# Patient Record
Sex: FEMALE | Race: BLACK OR AFRICAN AMERICAN | NOT HISPANIC OR LATINO
[De-identification: names, ages, dates, MRNs, and addresses within clinical notes are randomized per-mention and may not be internally consistent; named-entity substitution may affect disease eponyms.]

---

## 2017-02-11 PROBLEM — Z00.00 ENCOUNTER FOR PREVENTIVE HEALTH EXAMINATION: Status: ACTIVE | Noted: 2017-02-11

## 2017-02-23 ENCOUNTER — RECORD ABSTRACTING (OUTPATIENT)
Age: 67
End: 2017-02-23

## 2017-02-23 DIAGNOSIS — Z92.241 PERSONAL HISTORY OF SYSTEMIC STEROID THERAPY: ICD-10-CM

## 2017-02-23 DIAGNOSIS — M25.562 PAIN IN RIGHT KNEE: ICD-10-CM

## 2017-02-23 DIAGNOSIS — M25.561 PAIN IN RIGHT KNEE: ICD-10-CM

## 2017-02-23 DIAGNOSIS — Z78.9 OTHER SPECIFIED HEALTH STATUS: ICD-10-CM

## 2017-02-23 DIAGNOSIS — M65.9 SYNOVITIS AND TENOSYNOVITIS, UNSPECIFIED: ICD-10-CM

## 2017-02-23 DIAGNOSIS — M89.9 DISORDER OF BONE, UNSPECIFIED: ICD-10-CM

## 2017-02-23 RX ORDER — AMLODIPINE BESYLATE 5 MG/1
TABLET ORAL
Refills: 0 | Status: ACTIVE | COMMUNITY

## 2017-02-23 RX ORDER — ACETAMINOPHEN 325 MG/1
TABLET, FILM COATED ORAL
Refills: 0 | Status: ACTIVE | COMMUNITY

## 2017-02-23 RX ORDER — HYDROCHLOROTHIAZIDE 12.5 MG/1
TABLET ORAL
Refills: 0 | Status: ACTIVE | COMMUNITY

## 2017-03-10 ENCOUNTER — APPOINTMENT (OUTPATIENT)
Dept: ORTHOPEDIC SURGERY | Facility: CLINIC | Age: 67
End: 2017-03-10

## 2017-03-10 VITALS
HEART RATE: 106 BPM | SYSTOLIC BLOOD PRESSURE: 174 MMHG | DIASTOLIC BLOOD PRESSURE: 105 MMHG | WEIGHT: 129 LBS | HEIGHT: 68 IN | BODY MASS INDEX: 19.55 KG/M2

## 2017-03-10 DIAGNOSIS — M25.461 EFFUSION, RIGHT KNEE: ICD-10-CM

## 2017-03-10 DIAGNOSIS — M25.561 PAIN IN RIGHT KNEE: ICD-10-CM

## 2017-03-24 ENCOUNTER — APPOINTMENT (OUTPATIENT)
Dept: ORTHOPEDIC SURGERY | Facility: CLINIC | Age: 67
End: 2017-03-24

## 2017-05-10 ENCOUNTER — RX RENEWAL (OUTPATIENT)
Age: 67
End: 2017-05-10

## 2017-05-10 RX ORDER — CELECOXIB 200 MG/1
200 CAPSULE ORAL
Qty: 2 | Refills: 0 | Status: ACTIVE | COMMUNITY
Start: 2017-05-10 | End: 1900-01-01

## 2017-05-15 ENCOUNTER — OUTPATIENT (OUTPATIENT)
Dept: OUTPATIENT SERVICES | Facility: HOSPITAL | Age: 67
LOS: 1 days | End: 2017-05-15
Payer: MEDICARE

## 2017-05-15 ENCOUNTER — APPOINTMENT (OUTPATIENT)
Dept: SURGERY | Facility: CLINIC | Age: 67
End: 2017-05-15

## 2017-05-15 VITALS
HEIGHT: 66 IN | SYSTOLIC BLOOD PRESSURE: 150 MMHG | WEIGHT: 136.69 LBS | RESPIRATION RATE: 16 BRPM | TEMPERATURE: 99 F | DIASTOLIC BLOOD PRESSURE: 90 MMHG | HEART RATE: 90 BPM

## 2017-05-15 DIAGNOSIS — M70.22 OLECRANON BURSITIS, LEFT ELBOW: ICD-10-CM

## 2017-05-15 DIAGNOSIS — M17.11 UNILATERAL PRIMARY OSTEOARTHRITIS, RIGHT KNEE: ICD-10-CM

## 2017-05-15 DIAGNOSIS — Z96.652 PRESENCE OF LEFT ARTIFICIAL KNEE JOINT: Chronic | ICD-10-CM

## 2017-05-15 DIAGNOSIS — I10 ESSENTIAL (PRIMARY) HYPERTENSION: ICD-10-CM

## 2017-05-15 DIAGNOSIS — J30.9 ALLERGIC RHINITIS, UNSPECIFIED: ICD-10-CM

## 2017-05-15 DIAGNOSIS — F41.1 GENERALIZED ANXIETY DISORDER: ICD-10-CM

## 2017-05-15 DIAGNOSIS — F41.9 ANXIETY DISORDER, UNSPECIFIED: ICD-10-CM

## 2017-05-15 DIAGNOSIS — Z01.818 ENCOUNTER FOR OTHER PREPROCEDURAL EXAMINATION: ICD-10-CM

## 2017-05-15 DIAGNOSIS — M85.80 OTHER SPECIFIED DISORDERS OF BONE DENSITY AND STRUCTURE, UNSPECIFIED SITE: ICD-10-CM

## 2017-05-15 LAB
ALBUMIN SERPL ELPH-MCNC: 3.1 G/DL — LOW (ref 3.4–5)
ALP SERPL-CCNC: 92 U/L — SIGNIFICANT CHANGE UP (ref 40–120)
ALT FLD-CCNC: 15 U/L — SIGNIFICANT CHANGE UP (ref 12–42)
ANION GAP SERPL CALC-SCNC: 7 MMOL/L — LOW (ref 9–16)
APPEARANCE UR: CLEAR — SIGNIFICANT CHANGE UP
APTT BLD: 33.2 SEC — SIGNIFICANT CHANGE UP (ref 27.5–37.4)
AST SERPL-CCNC: 10 U/L — LOW (ref 15–37)
BACTERIA # UR AUTO: PRESENT /HPF
BILIRUB SERPL-MCNC: 0.8 MG/DL — SIGNIFICANT CHANGE UP (ref 0.2–1.2)
BILIRUB UR-MCNC: NEGATIVE — SIGNIFICANT CHANGE UP
BUN SERPL-MCNC: 7 MG/DL — SIGNIFICANT CHANGE UP (ref 7–23)
CALCIUM SERPL-MCNC: 9.8 MG/DL — SIGNIFICANT CHANGE UP (ref 8.5–10.5)
CHLORIDE SERPL-SCNC: 106 MMOL/L — SIGNIFICANT CHANGE UP (ref 96–108)
CO2 SERPL-SCNC: 27 MMOL/L — SIGNIFICANT CHANGE UP (ref 22–31)
COLOR SPEC: YELLOW — SIGNIFICANT CHANGE UP
COMMENT - URINE: SIGNIFICANT CHANGE UP
CREAT SERPL-MCNC: 0.61 MG/DL — SIGNIFICANT CHANGE UP (ref 0.5–1.3)
DIFF PNL FLD: (no result)
EPI CELLS # UR: SIGNIFICANT CHANGE UP /HPF
GLUCOSE SERPL-MCNC: 79 MG/DL — SIGNIFICANT CHANGE UP (ref 70–99)
GLUCOSE UR QL: NEGATIVE — SIGNIFICANT CHANGE UP
HCT VFR BLD CALC: 37 % — SIGNIFICANT CHANGE UP (ref 34.5–45)
HGB BLD-MCNC: 12.4 G/DL — SIGNIFICANT CHANGE UP (ref 11.5–15.5)
INR BLD: 1.14 — SIGNIFICANT CHANGE UP (ref 0.88–1.16)
KETONES UR-MCNC: NEGATIVE — SIGNIFICANT CHANGE UP
LEUKOCYTE ESTERASE UR-ACNC: (no result)
MCHC RBC-ENTMCNC: 28.4 PG — SIGNIFICANT CHANGE UP (ref 27–34)
MCHC RBC-ENTMCNC: 33.5 G/DL — SIGNIFICANT CHANGE UP (ref 32–36)
MCV RBC AUTO: 84.9 FL — SIGNIFICANT CHANGE UP (ref 80–100)
NITRITE UR-MCNC: NEGATIVE — SIGNIFICANT CHANGE UP
PH UR: 6 — SIGNIFICANT CHANGE UP (ref 5–8)
PLATELET # BLD AUTO: 298 K/UL — SIGNIFICANT CHANGE UP (ref 150–400)
POTASSIUM SERPL-MCNC: 3.8 MMOL/L — SIGNIFICANT CHANGE UP (ref 3.5–5.3)
POTASSIUM SERPL-SCNC: 3.8 MMOL/L — SIGNIFICANT CHANGE UP (ref 3.5–5.3)
PROT SERPL-MCNC: 8.6 G/DL — HIGH (ref 6.4–8.2)
PROT UR-MCNC: NEGATIVE MG/DL — SIGNIFICANT CHANGE UP
PROTHROM AB SERPL-ACNC: 12.7 SEC — SIGNIFICANT CHANGE UP (ref 9.8–12.7)
RBC # BLD: 4.36 M/UL — SIGNIFICANT CHANGE UP (ref 3.8–5.2)
RBC # FLD: 14.9 % — SIGNIFICANT CHANGE UP (ref 10.3–16.9)
RBC CASTS # UR COMP ASSIST: < 5 /HPF — SIGNIFICANT CHANGE UP
SODIUM SERPL-SCNC: 140 MMOL/L — SIGNIFICANT CHANGE UP (ref 135–145)
SP GR SPEC: 1.01 — SIGNIFICANT CHANGE UP (ref 1–1.03)
UROBILINOGEN FLD QL: 0.2 E.U./DL — SIGNIFICANT CHANGE UP
WBC # BLD: 5 K/UL — SIGNIFICANT CHANGE UP (ref 3.8–10.5)
WBC # FLD AUTO: 5 K/UL — SIGNIFICANT CHANGE UP (ref 3.8–10.5)
WBC UR QL: (no result) /HPF

## 2017-05-15 PROCEDURE — 71020: CPT | Mod: 26

## 2017-05-15 PROCEDURE — 93010 ELECTROCARDIOGRAM REPORT: CPT

## 2017-05-15 NOTE — H&P PST ADULT - NSANTHOSAYNRD_GEN_A_CORE
No. HARVEY screening performed.  STOP BANG Legend: 0-2 = LOW Risk; 3-4 = INTERMEDIATE Risk; 5-8 = HIGH Risk

## 2017-05-15 NOTE — H&P PST ADULT - HISTORY OF PRESENT ILLNESS
66 year old female with osteoarthritis right knee with moderate knee pain, deformity, decreased ROM and unsteady gait progressively increased over years.  X rays confirmed diagnosis.  Symptoms worse with stairs and after prolonged imobility. 66 year old black female with osteoarthritis right knee with moderate knee pain, deformity, decreased ROM and unsteady gait progressively increased over years, worse since August 2016 after left TKR done.  X rays confirmed diagnosis.  Symptoms worse with stairs and after prolonged imobility.

## 2017-05-15 NOTE — H&P PST ADULT - REASON FOR ADMISSION
8/23/2023              Susan Joseph        [id number] [place] [address],     This is the office of Dr. Maida Fleischer. Thank you for putting your trust in [place] [address]. Our goal is to deliver the highest quality healthcare and an exceptional patient experience. Upon reviewing of your medical record shows you are due for the following:       Annual Medicare Physical       Please call [phone] to schedule your appointment or schedule online via [employer]. If you changed to a new provider at another facility, please notify the clinic to update your records. If you had any recent testing at another facility, please have your results faxed to our office at [phone]. Thank you and have a great day! pre op clearance for right TKR

## 2017-05-15 NOTE — H&P PST ADULT - FAMILY HISTORY
Father  Still living? No  Family history of hypertension in father, Age at diagnosis: Age Unknown     Mother  Still living? No  Family history of hypertension in mother, Age at diagnosis: Age Unknown     Sibling  Still living? No  Family history of hypertension in sister, Age at diagnosis: Age Unknown

## 2017-05-16 LAB
CULTURE RESULTS: NO GROWTH — SIGNIFICANT CHANGE UP
SPECIMEN SOURCE: SIGNIFICANT CHANGE UP

## 2017-05-17 LAB
MRSA PCR RESULT.: NEGATIVE — SIGNIFICANT CHANGE UP
S AUREUS DNA NOSE QL NAA+PROBE: NEGATIVE — SIGNIFICANT CHANGE UP

## 2017-06-02 RX ORDER — DOCUSATE SODIUM 100 MG
100 CAPSULE ORAL THREE TIMES A DAY
Qty: 0 | Refills: 0 | Status: DISCONTINUED | OUTPATIENT
Start: 2017-06-06 | End: 2017-06-09

## 2017-06-02 RX ORDER — AMLODIPINE BESYLATE 2.5 MG/1
10 TABLET ORAL DAILY
Qty: 0 | Refills: 0 | Status: DISCONTINUED | OUTPATIENT
Start: 2017-06-06 | End: 2017-06-07

## 2017-06-02 RX ORDER — FERROUS SULFATE 325(65) MG
325 TABLET ORAL
Qty: 0 | Refills: 0 | Status: DISCONTINUED | OUTPATIENT
Start: 2017-06-06 | End: 2017-06-09

## 2017-06-02 RX ORDER — SODIUM CHLORIDE 9 MG/ML
1000 INJECTION, SOLUTION INTRAVENOUS
Qty: 0 | Refills: 0 | Status: DISCONTINUED | OUTPATIENT
Start: 2017-06-06 | End: 2017-06-08

## 2017-06-02 RX ORDER — ENOXAPARIN SODIUM 100 MG/ML
40 INJECTION SUBCUTANEOUS DAILY
Qty: 0 | Refills: 0 | Status: DISCONTINUED | OUTPATIENT
Start: 2017-06-07 | End: 2017-06-09

## 2017-06-02 RX ORDER — ACETAMINOPHEN 500 MG
650 TABLET ORAL EVERY 6 HOURS
Qty: 0 | Refills: 0 | Status: DISCONTINUED | OUTPATIENT
Start: 2017-06-06 | End: 2017-06-09

## 2017-06-02 RX ORDER — MAGNESIUM HYDROXIDE 400 MG/1
30 TABLET, CHEWABLE ORAL DAILY
Qty: 0 | Refills: 0 | Status: DISCONTINUED | OUTPATIENT
Start: 2017-06-06 | End: 2017-06-09

## 2017-06-02 RX ORDER — SENNA PLUS 8.6 MG/1
2 TABLET ORAL AT BEDTIME
Qty: 0 | Refills: 0 | Status: DISCONTINUED | OUTPATIENT
Start: 2017-06-06 | End: 2017-06-09

## 2017-06-06 ENCOUNTER — APPOINTMENT (OUTPATIENT)
Dept: ORTHOPEDIC SURGERY | Facility: HOSPITAL | Age: 67
End: 2017-06-06

## 2017-06-06 ENCOUNTER — RESULT REVIEW (OUTPATIENT)
Age: 67
End: 2017-06-06

## 2017-06-06 ENCOUNTER — INPATIENT (INPATIENT)
Facility: HOSPITAL | Age: 67
LOS: 2 days | Discharge: NURSING, REHAB OR LONG-TERM CARE | DRG: 470 | End: 2017-06-09
Attending: ORTHOPAEDIC SURGERY | Admitting: ORTHOPAEDIC SURGERY
Payer: MEDICARE

## 2017-06-06 VITALS
HEIGHT: 65 IN | HEART RATE: 113 BPM | DIASTOLIC BLOOD PRESSURE: 92 MMHG | TEMPERATURE: 98 F | OXYGEN SATURATION: 100 % | WEIGHT: 127.21 LBS | SYSTOLIC BLOOD PRESSURE: 159 MMHG | RESPIRATION RATE: 16 BRPM

## 2017-06-06 DIAGNOSIS — M19.90 UNSPECIFIED OSTEOARTHRITIS, UNSPECIFIED SITE: ICD-10-CM

## 2017-06-06 DIAGNOSIS — M85.80 OTHER SPECIFIED DISORDERS OF BONE DENSITY AND STRUCTURE, UNSPECIFIED SITE: ICD-10-CM

## 2017-06-06 DIAGNOSIS — Z96.652 PRESENCE OF LEFT ARTIFICIAL KNEE JOINT: Chronic | ICD-10-CM

## 2017-06-06 DIAGNOSIS — M17.11 UNILATERAL PRIMARY OSTEOARTHRITIS, RIGHT KNEE: ICD-10-CM

## 2017-06-06 DIAGNOSIS — F41.1 GENERALIZED ANXIETY DISORDER: ICD-10-CM

## 2017-06-06 DIAGNOSIS — I10 ESSENTIAL (PRIMARY) HYPERTENSION: ICD-10-CM

## 2017-06-06 LAB
ANION GAP SERPL CALC-SCNC: 12 MMOL/L — SIGNIFICANT CHANGE UP (ref 5–17)
BUN SERPL-MCNC: 7 MG/DL — SIGNIFICANT CHANGE UP (ref 7–23)
CALCIUM SERPL-MCNC: 8.8 MG/DL — SIGNIFICANT CHANGE UP (ref 8.4–10.5)
CHLORIDE SERPL-SCNC: 104 MMOL/L — SIGNIFICANT CHANGE UP (ref 96–108)
CO2 SERPL-SCNC: 23 MMOL/L — SIGNIFICANT CHANGE UP (ref 22–31)
CREAT SERPL-MCNC: 0.6 MG/DL — SIGNIFICANT CHANGE UP (ref 0.5–1.3)
GLUCOSE SERPL-MCNC: 132 MG/DL — HIGH (ref 70–99)
HCT VFR BLD CALC: 30.3 % — LOW (ref 34.5–45)
HGB BLD-MCNC: 10.3 G/DL — LOW (ref 11.5–15.5)
MCHC RBC-ENTMCNC: 29 PG — SIGNIFICANT CHANGE UP (ref 27–34)
MCHC RBC-ENTMCNC: 34 G/DL — SIGNIFICANT CHANGE UP (ref 32–36)
MCV RBC AUTO: 85.4 FL — SIGNIFICANT CHANGE UP (ref 80–100)
PLATELET # BLD AUTO: 238 K/UL — SIGNIFICANT CHANGE UP (ref 150–400)
POTASSIUM SERPL-MCNC: 3.5 MMOL/L — SIGNIFICANT CHANGE UP (ref 3.5–5.3)
POTASSIUM SERPL-SCNC: 3.5 MMOL/L — SIGNIFICANT CHANGE UP (ref 3.5–5.3)
RBC # BLD: 3.55 M/UL — LOW (ref 3.8–5.2)
RBC # FLD: 14.8 % — SIGNIFICANT CHANGE UP (ref 10.3–16.9)
SODIUM SERPL-SCNC: 139 MMOL/L — SIGNIFICANT CHANGE UP (ref 135–145)
WBC # BLD: 4.5 K/UL — SIGNIFICANT CHANGE UP (ref 3.8–10.5)
WBC # FLD AUTO: 4.5 K/UL — SIGNIFICANT CHANGE UP (ref 3.8–10.5)

## 2017-06-06 PROCEDURE — 27447 TOTAL KNEE ARTHROPLASTY: CPT | Mod: RT

## 2017-06-06 PROCEDURE — 73560 X-RAY EXAM OF KNEE 1 OR 2: CPT | Mod: 26,RT

## 2017-06-06 RX ORDER — VANCOMYCIN HCL 1 G
1000 VIAL (EA) INTRAVENOUS EVERY 12 HOURS
Qty: 0 | Refills: 0 | Status: COMPLETED | OUTPATIENT
Start: 2017-06-06 | End: 2017-06-06

## 2017-06-06 RX ORDER — BUPIVACAINE 13.3 MG/ML
20 INJECTION, SUSPENSION, LIPOSOMAL INFILTRATION ONCE
Qty: 0 | Refills: 0 | Status: DISCONTINUED | OUTPATIENT
Start: 2017-06-06 | End: 2017-06-07

## 2017-06-06 RX ORDER — HYDROMORPHONE HYDROCHLORIDE 2 MG/ML
0.5 INJECTION INTRAMUSCULAR; INTRAVENOUS; SUBCUTANEOUS
Qty: 0 | Refills: 0 | Status: DISCONTINUED | OUTPATIENT
Start: 2017-06-06 | End: 2017-06-09

## 2017-06-06 RX ORDER — ACETAMINOPHEN 500 MG
1000 TABLET ORAL ONCE
Qty: 0 | Refills: 0 | Status: COMPLETED | OUTPATIENT
Start: 2017-06-06 | End: 2017-06-06

## 2017-06-06 RX ORDER — HYDROMORPHONE HYDROCHLORIDE 2 MG/ML
0.5 INJECTION INTRAMUSCULAR; INTRAVENOUS; SUBCUTANEOUS
Qty: 0 | Refills: 0 | Status: DISCONTINUED | OUTPATIENT
Start: 2017-06-06 | End: 2017-06-06

## 2017-06-06 RX ORDER — FUROSEMIDE 40 MG
1 TABLET ORAL
Qty: 0 | Refills: 0 | COMMUNITY

## 2017-06-06 RX ORDER — OXYCODONE HYDROCHLORIDE 5 MG/1
10 TABLET ORAL EVERY 4 HOURS
Qty: 0 | Refills: 0 | Status: DISCONTINUED | OUTPATIENT
Start: 2017-06-06 | End: 2017-06-09

## 2017-06-06 RX ORDER — OXYCODONE HYDROCHLORIDE 5 MG/1
5 TABLET ORAL EVERY 4 HOURS
Qty: 0 | Refills: 0 | Status: DISCONTINUED | OUTPATIENT
Start: 2017-06-06 | End: 2017-06-09

## 2017-06-06 RX ADMIN — HYDROMORPHONE HYDROCHLORIDE 0.5 MILLIGRAM(S): 2 INJECTION INTRAMUSCULAR; INTRAVENOUS; SUBCUTANEOUS at 14:00

## 2017-06-06 RX ADMIN — HYDROMORPHONE HYDROCHLORIDE 0.5 MILLIGRAM(S): 2 INJECTION INTRAMUSCULAR; INTRAVENOUS; SUBCUTANEOUS at 13:50

## 2017-06-06 RX ADMIN — HYDROMORPHONE HYDROCHLORIDE 0.5 MILLIGRAM(S): 2 INJECTION INTRAMUSCULAR; INTRAVENOUS; SUBCUTANEOUS at 14:25

## 2017-06-06 RX ADMIN — Medication 400 MILLIGRAM(S): at 20:55

## 2017-06-06 RX ADMIN — SODIUM CHLORIDE 75 MILLILITER(S): 9 INJECTION, SOLUTION INTRAVENOUS at 16:54

## 2017-06-06 RX ADMIN — Medication 325 MILLIGRAM(S): at 17:47

## 2017-06-06 RX ADMIN — Medication 100 MILLIGRAM(S): at 21:05

## 2017-06-06 RX ADMIN — Medication 25 MILLIGRAM(S): at 21:05

## 2017-06-06 RX ADMIN — Medication 1000 MILLIGRAM(S): at 21:30

## 2017-06-06 RX ADMIN — HYDROMORPHONE HYDROCHLORIDE 0.5 MILLIGRAM(S): 2 INJECTION INTRAMUSCULAR; INTRAVENOUS; SUBCUTANEOUS at 14:40

## 2017-06-06 RX ADMIN — Medication 250 MILLIGRAM(S): at 21:45

## 2017-06-06 RX ADMIN — HYDROMORPHONE HYDROCHLORIDE 0.5 MILLIGRAM(S): 2 INJECTION INTRAMUSCULAR; INTRAVENOUS; SUBCUTANEOUS at 14:15

## 2017-06-06 RX ADMIN — HYDROMORPHONE HYDROCHLORIDE 0.5 MILLIGRAM(S): 2 INJECTION INTRAMUSCULAR; INTRAVENOUS; SUBCUTANEOUS at 15:47

## 2017-06-06 NOTE — PHYSICAL THERAPY INITIAL EVALUATION ADULT - ADDITIONAL COMMENTS
Pt was using a cane and a RW prior to surgery. Per daughter pt lives in a ranch style home with 5 steps to enter the home.

## 2017-06-06 NOTE — H&P ADULT - HISTORY OF PRESENT ILLNESS
66 y.o. F. with h/o HTN and severe Right knee DJD with pain, swelling and decreased ROM presents today at [facility] for her Right TKR with Dr. Billings

## 2017-06-06 NOTE — PHYSICAL THERAPY INITIAL EVALUATION ADULT - PERTINENT HX OF CURRENT PROBLEM, REHAB EVAL
66 y.o. F. with h/o HTN and severe Right knee DJD with pain, swelling and decreased ROM presents today at [facility] for her Right TKR with Dr. Billings

## 2017-06-06 NOTE — PHYSICAL THERAPY INITIAL EVALUATION ADULT - ACTIVE RANGE OF MOTION EXAMINATION, REHAB EVAL
Right UE Active ROM was WFL (within functional limits)/Left LE Active ROM was WFL (within functional limits)/RLE active flexion 0-70 degrees/Left UE Active ROM was WFL (within functional limits)

## 2017-06-06 NOTE — PHYSICAL THERAPY INITIAL EVALUATION ADULT - GAIT DEVIATIONS NOTED, PT EVAL
increased time in double stance/decreased stride length/decreased step length/decreased weight-shifting ability/decreased velocity of limb motion

## 2017-06-06 NOTE — CONSULT NOTE ADULT - SUBJECTIVE AND OBJECTIVE BOX
Pain Management Consult Note - [facility] [phone]    Chief Complaint:  Right knee pain    HPI:  67 y/o female with right knee pain going for a right total knee replacement.  States she had a left total knee replacement back in Aug 2016 and had oxycodone at that time.  States it caused constipation.      Pain is ___ sharp ____dull ___burning __x_achy ___ Intensity: ____ mild ___mod ___severe     Location _x___surgical site ____cervical _____lumbar ____abd ____upper ext__x__lower ext    Worse with __x__activity ___x_movement _____physical therapy___ Rest    Improved with __x__medication _x___rest ____physical therapy    ROS: Const:  _-__febrile   Eyes:___ENT:_-__CV: _-__chest pain  Resp: _-___sob  GI:__-_nausea _-__vomiting _-__abd pain _+__npo ___clears __full diet __bm  :___ Musk: _+__pain ___spasm  Skin:___ Neuro:  _-__rsaptywc_-__ovdqlixmn_-__ numbness _+ (left leg)__weakness ___paresth  Psych:__anxiety  Endo:_-__ Heme:_-__Allergy:__PCN_______, ___all others reviewed and negative    PAST MEDICAL & SURGICAL HISTORY:  Osteopenia  Arthritis  HTN (hypertension)  H/O total knee replacement, left: (8/2016)  No significant past surgical history      SH: denies___Tobacco   _denies__Alcohol                          FH:FAMILY HISTORY:  Family history of hypertension in sister (Sibling)  Family history of hypertension in mother (Mother)  Family history of hypertension in father (Father)      BUpivacaine liposome 1.3% Injectable (no eMAR) 20milliLiter(s) Local Injection once      T(C): 36.8, Max: 36.8 (06-06 @ 08:22)  HR: 113 (113 - 113)  BP: 159/92 (159/92 - 159/92)  RR: 16 (16 - 16)  SpO2: 100% (100% - 100%)  Wt(kg): --    T(C): 36.8, Max: 36.8 (06-06 @ 08:22)  HR: 113 (113 - 113)  BP: 159/92 (159/92 - 159/92)  RR: 16 (16 - 16)  SpO2: 100% (100% - 100%)  Wt(kg): --    T(C): 36.8, Max: 36.8 (06-06 @ 08:22)  HR: 113 (113 - 113)  BP: 159/92 (159/92 - 159/92)  RR: 16 (16 - 16)  SpO2: 100% (100% - 100%)  Wt(kg): --    PHYSICAL EXAM:  Gen Appearance: _x__no acute distress _x__appropriate        Neuro: _x__SILT feet___x_ EOM Intact Psych: AAOX_3_, _x__mood/affect appropriate        Eyes: __x_conjunctiva WNL  ___x__ Pupils equal and round        ENT: _x__ears and nose atraumatic__x_ Hearing grossly intact        Neck: ___trachea midline, no visible masses ___thyroid without palpable mass    Resp: _x__Nml WOB____No tactile fremitus ___clear to auscultation    Cardio: ___extremities free from edema ____pedal pulses palpable    GI/Abdomen: _x__soft __x___ Nontender______Nondistended_____HSM    Lymphatic: ___no palpable nodes in neck  ___no palpable nodes calves and feet    Skin/Wound: _x__Incision (healed left knee incision), ___Dressing c/d/i,   ____surrounding tissues soft,  ___drain/chest tube present____    Muscular: EHL __5_/5  Gastrocnemius___/5    _x__absent clubbing/cyanosis      ASSESSMENT: This is a 66y old Female with a history of   M17.11: M17.11  No h/o HF  Family history of hypertension in sister (Sibling)  Family history of hypertension in mother (Mother)  Family history of hypertension in father (Father)  Osteopenia  Arthritis  HTN (hypertension)  Anticipatory anxiety: Anticipatory anxiety  Osteopenia: Osteopenia  Primary osteoarthritis of right knee: Primary osteoarthritis of right knee  Essential hypertension: Essential hypertension  Arthritis: Arthritis  Status post total knee replacement  H/O total knee replacement, left: (8/2016)  and worsening right knee pain going for right total knee replacement today.      Recommended Treatment PLAN:  1.  Oxycodone 5-10 mg po q4h prn  2.  Dilaudid 0.5 mg IV q2h prn  3.  Tylenol 1000 mg IV x1  4.  Lyrica 25 mg po TID

## 2017-06-06 NOTE — CONSULT NOTE ADULT - SUBJECTIVE AND OBJECTIVE BOX
HPI:  66 year old black female with osteoarthritis right knee with moderate knee pain, deformity, decreased ROM and unsteady gait progressively increased over years, worse since August 2016 after left TKR done.  X rays confirmed diagnosis.  Symptoms worse with stairs and after prolonged imobility.    PAST MEDICAL & SURGICAL HISTORY:  Osteopenia allergic rhinitis  Arthritis  HTN (hypertension)  H/O total knee replacement, left: (8/2016)  No significant past surgical history      REVIEW OF SYSTEMS    General:  malaise	  Skin/Breast: normal  Ophthalmologic: negative  ENMT:	normal  Respiratory and Thorax: normal  Cardiovascular:	normal  Gastrointestinal:	normal  Genitourinary:	normal  Musculoskeletal: right knee pain left elbow olecranon bursitis  Neurological:	normal  Psychiatric:	normal  Hematology/Lymphatics:	negative  Endocrine:	negative  Allergic/Immunologic:	negative      MEDICATIONS        amlodipine-benazepril 10 mg-20 mg oral capsule 1 cap(s) orally once a day  · 	Tylenol 325 mg oral capsule  orally as needed for pain         Allergies    penicillin (Urticaria)    SOCIAL HISTORY: no cigs  social ETOH    FAMILY HISTORY:  Family history of hypertension in sister (Sibling)  Family history of hypertension in mother (Mother)  Family history of hypertension in father (Father)      PHYSICAL EXAM:  Vital Signs Last 24 Hrs  T(C): --  T(F): --98.6  HR: --70  BP: --120/80  BP(mean): --  RR: --16  SpO2: --    Constitutional: WDWNF in NAD  Eyes: conj pink  ENMT: negative  Neck: supple  Breasts: not examined   Back: negative  Respiratory: clear to P&A  Cardiovascular: no MRGT or H  Gastrointestinal: normal bowel sounds  Genitourinary: neg  Rectal: not examined  Extremities: normal  Vascular: normal  Neurological: normal  Skin: negative  Lymph Nodes: negative  Musculoskeletal:   normal  Psychiatric: anxiety

## 2017-06-06 NOTE — PHYSICAL THERAPY INITIAL EVALUATION ADULT - CRITERIA FOR SKILLED THERAPEUTIC INTERVENTIONS
anticipated discharge recommendation/rehab potential/anticipated equipment needs at discharge/therapy frequency/risk reduction/prevention/impairments found/predicted duration of therapy intervention/functional limitations in following categories

## 2017-06-07 LAB
ALBUMIN SERPL ELPH-MCNC: 3.1 G/DL — LOW (ref 3.3–5)
ALP SERPL-CCNC: 75 U/L — SIGNIFICANT CHANGE UP (ref 40–120)
ALT FLD-CCNC: 10 U/L — SIGNIFICANT CHANGE UP (ref 10–45)
ANION GAP SERPL CALC-SCNC: 18 MMOL/L — HIGH (ref 5–17)
AST SERPL-CCNC: 13 U/L — SIGNIFICANT CHANGE UP (ref 10–40)
BILIRUB SERPL-MCNC: 0.4 MG/DL — SIGNIFICANT CHANGE UP (ref 0.2–1.2)
BUN SERPL-MCNC: 5 MG/DL — LOW (ref 7–23)
CALCIUM SERPL-MCNC: 9.4 MG/DL — SIGNIFICANT CHANGE UP (ref 8.4–10.5)
CHLORIDE SERPL-SCNC: 94 MMOL/L — LOW (ref 96–108)
CO2 SERPL-SCNC: 23 MMOL/L — SIGNIFICANT CHANGE UP (ref 22–31)
CREAT SERPL-MCNC: 0.4 MG/DL — LOW (ref 0.5–1.3)
GLUCOSE SERPL-MCNC: 117 MG/DL — HIGH (ref 70–99)
HCT VFR BLD CALC: 32.7 % — LOW (ref 34.5–45)
HGB BLD-MCNC: 11.2 G/DL — LOW (ref 11.5–15.5)
MCHC RBC-ENTMCNC: 28.8 PG — SIGNIFICANT CHANGE UP (ref 27–34)
MCHC RBC-ENTMCNC: 34.3 G/DL — SIGNIFICANT CHANGE UP (ref 32–36)
MCV RBC AUTO: 84.1 FL — SIGNIFICANT CHANGE UP (ref 80–100)
PLATELET # BLD AUTO: 269 K/UL — SIGNIFICANT CHANGE UP (ref 150–400)
POTASSIUM SERPL-MCNC: 3.3 MMOL/L — LOW (ref 3.5–5.3)
POTASSIUM SERPL-SCNC: 3.3 MMOL/L — LOW (ref 3.5–5.3)
PROT SERPL-MCNC: 7.7 G/DL — SIGNIFICANT CHANGE UP (ref 6–8.3)
RBC # BLD: 3.89 M/UL — SIGNIFICANT CHANGE UP (ref 3.8–5.2)
RBC # FLD: 14.2 % — SIGNIFICANT CHANGE UP (ref 10.3–16.9)
SODIUM SERPL-SCNC: 135 MMOL/L — SIGNIFICANT CHANGE UP (ref 135–145)
SURGICAL PATHOLOGY STUDY: SIGNIFICANT CHANGE UP
WBC # BLD: 7 K/UL — SIGNIFICANT CHANGE UP (ref 3.8–10.5)
WBC # FLD AUTO: 7 K/UL — SIGNIFICANT CHANGE UP (ref 3.8–10.5)

## 2017-06-07 RX ORDER — CELECOXIB 200 MG/1
1 CAPSULE ORAL
Qty: 0 | Refills: 0 | COMMUNITY

## 2017-06-07 RX ORDER — ACETAMINOPHEN 500 MG
975 TABLET ORAL EVERY 8 HOURS
Qty: 0 | Refills: 0 | Status: DISCONTINUED | OUTPATIENT
Start: 2017-06-07 | End: 2017-06-09

## 2017-06-07 RX ORDER — ENOXAPARIN SODIUM 100 MG/ML
40 INJECTION SUBCUTANEOUS
Qty: 0 | Refills: 0 | COMMUNITY
Start: 2017-06-07 | End: 2017-06-21

## 2017-06-07 RX ORDER — SENNA PLUS 8.6 MG/1
2 TABLET ORAL
Qty: 0 | Refills: 0 | COMMUNITY
Start: 2017-06-07

## 2017-06-07 RX ORDER — ACETAMINOPHEN 500 MG
2 TABLET ORAL
Qty: 0 | Refills: 0 | COMMUNITY
Start: 2017-06-07

## 2017-06-07 RX ORDER — ACETAMINOPHEN 500 MG
2 TABLET ORAL
Qty: 0 | Refills: 0 | COMMUNITY

## 2017-06-07 RX ORDER — POTASSIUM CHLORIDE 20 MEQ
40 PACKET (EA) ORAL ONCE
Qty: 0 | Refills: 0 | Status: COMPLETED | OUTPATIENT
Start: 2017-06-07 | End: 2017-06-07

## 2017-06-07 RX ORDER — OXYCODONE HYDROCHLORIDE 5 MG/1
1 TABLET ORAL
Qty: 0 | Refills: 0 | COMMUNITY
Start: 2017-06-07

## 2017-06-07 RX ORDER — DOCUSATE SODIUM 100 MG
1 CAPSULE ORAL
Qty: 0 | Refills: 0 | COMMUNITY
Start: 2017-06-07

## 2017-06-07 RX ORDER — ACETAMINOPHEN 500 MG
0 TABLET ORAL
Qty: 0 | Refills: 0 | COMMUNITY

## 2017-06-07 RX ADMIN — AMLODIPINE BESYLATE 10 MILLIGRAM(S): 2.5 TABLET ORAL at 05:06

## 2017-06-07 RX ADMIN — Medication 1 TABLET(S): at 08:35

## 2017-06-07 RX ADMIN — HYDROMORPHONE HYDROCHLORIDE 0.5 MILLIGRAM(S): 2 INJECTION INTRAMUSCULAR; INTRAVENOUS; SUBCUTANEOUS at 10:17

## 2017-06-07 RX ADMIN — Medication 25 MILLIGRAM(S): at 20:13

## 2017-06-07 RX ADMIN — Medication 325 MILLIGRAM(S): at 17:39

## 2017-06-07 RX ADMIN — Medication 25 MILLIGRAM(S): at 13:16

## 2017-06-07 RX ADMIN — OXYCODONE HYDROCHLORIDE 10 MILLIGRAM(S): 5 TABLET ORAL at 20:13

## 2017-06-07 RX ADMIN — OXYCODONE HYDROCHLORIDE 10 MILLIGRAM(S): 5 TABLET ORAL at 09:11

## 2017-06-07 RX ADMIN — Medication 325 MILLIGRAM(S): at 05:06

## 2017-06-07 RX ADMIN — Medication 100 MILLIGRAM(S): at 13:16

## 2017-06-07 RX ADMIN — Medication 975 MILLIGRAM(S): at 20:13

## 2017-06-07 RX ADMIN — HYDROMORPHONE HYDROCHLORIDE 0.5 MILLIGRAM(S): 2 INJECTION INTRAMUSCULAR; INTRAVENOUS; SUBCUTANEOUS at 10:30

## 2017-06-07 RX ADMIN — Medication 325 MILLIGRAM(S): at 13:16

## 2017-06-07 RX ADMIN — Medication 100 MILLIGRAM(S): at 05:06

## 2017-06-07 RX ADMIN — Medication 100 MILLIGRAM(S): at 20:13

## 2017-06-07 RX ADMIN — OXYCODONE HYDROCHLORIDE 10 MILLIGRAM(S): 5 TABLET ORAL at 10:10

## 2017-06-07 RX ADMIN — Medication 25 MILLIGRAM(S): at 05:06

## 2017-06-07 RX ADMIN — OXYCODONE HYDROCHLORIDE 10 MILLIGRAM(S): 5 TABLET ORAL at 21:13

## 2017-06-07 RX ADMIN — OXYCODONE HYDROCHLORIDE 10 MILLIGRAM(S): 5 TABLET ORAL at 05:49

## 2017-06-07 RX ADMIN — Medication 40 MILLIEQUIVALENT(S): at 08:35

## 2017-06-07 RX ADMIN — ENOXAPARIN SODIUM 40 MILLIGRAM(S): 100 INJECTION SUBCUTANEOUS at 07:21

## 2017-06-07 RX ADMIN — OXYCODONE HYDROCHLORIDE 10 MILLIGRAM(S): 5 TABLET ORAL at 05:06

## 2017-06-07 NOTE — PROGRESS NOTE ADULT - SUBJECTIVE AND OBJECTIVE BOX
ORTHO NOTE    [x ] Pt seen/examined.  [ ] Pt without any complaints/in NAD.    [x ] Pt complains of: bleeding and incisional pain  Patient speaks Swedish Creole and refused  line, preferring daughter to translate.  Patient and daughter verbalized understanding of conversation and physical exam      ROS: [ ] Fever  [ ] Chills  [ ] CP [ ] SOB [ ] Dysnea  [ ] Palpitations [ ] Cough [ ] N/V/C/D [ ] Paresthia [ ] Other     [x ] ROS  otherwise negative    .    PHYSICAL EXAM:    Vital Signs Last 24 Hrs  T(C): 36.5, Max: 36.7 (06-06 @ 14:50)  T(F): 97.7, Max: 98.1 (06-06 @ 14:50)  HR: 84 (79 - 89)  BP: 143/87 (111/68 - 150/88)  BP(mean): --  RR: 14 (12 - 19)  SpO2: 99% (96% - 100%)    I&O's Detail  I & Os for 24h ending 07 Jun 2017 07:00  =============================================  IN:    lactated ringers.: 900 ml    IV PiggyBack: 250 ml    Total IN: 1150 ml  ---------------------------------------------  OUT:    Voided: 3125 ml    Accordian: 205 ml    Total OUT: 3330 ml  ---------------------------------------------  Total NET: -2180 ml    I & Os for current day (as of 07 Jun 2017 13:16)  =============================================  IN:    Oral Fluid: 300 ml    Total IN: 300 ml  ---------------------------------------------  OUT:    Voided: 500 ml    Total OUT: 500 ml  ---------------------------------------------  Total NET: -200 ml       CAPILLARY BLOOD GLUCOSE                  Neuro: AAOX3    Lungs: CTA, IS demonstrated    CV:    ABD: soft, nontender, bowel regimen    Ext: bleeding on right knee lateral and posterior dsg which drain site was reinforced and blood stained dressing removed, R LE NVID strength 5/5, ROM and extension exercises demonstrated      LABS                        11.2   7.0   )-----------( 269      ( 07 Jun 2017 07:30 )             32.7                                06-07    135  |  94<L>  |  5<L>  ----------------------------<  117<H>  3.3<L>   |  23  |  0.40<L>    Ca    9.4      07 Jun 2017 07:30    TPro  7.7  /  Alb  3.1<L>  /  TBili  0.4  /  DBili  x   /  AST  13  /  ALT  10  /  AlkPhos  75  06-07      [ ] Other Labs  [ ] None ordered            Please check or [place] when present:  •  Heart Failure:    [ ] Acute        [ ]  Acute on Chronic        [ ] Chronic         [ ] Diastolic     [ ]  Combined    •  BRITTANIE:     [ ] ATN        [ ]  Renal medullary necrosis       [ ]  Renal cortical necrosis                  [ ] Other pathological Lesion:  •  CKD:  [ ] Stage I   [ ] Stage II  [ ] Stage III    [ ]Stage IV   [ ]  CKD V   [ ]  Other/Unspecified:    •  Abdominal Nutritional Status:   [ ] Malnutrition-See Nutrition note    [ ] Cachexia   [ ]  Other        [ ] Supplement ordered:            [ ] Morbid Obesity: BMI >=40         ASSESSMENT/PLAN:      STATUS POST: R TKA    CONTINUE:          [ ] PT- WBAT    [ ] DVT PPX- lovenox 40mg daily    [ ] Pain Mgt- followed by pain management    [ ] Dispo plan- FELIX

## 2017-06-07 NOTE — DISCHARGE NOTE ADULT - PLAN OF CARE
Improved ambulation and decreased pain after right total knee replacement 6/6/17 Weight bearing as tolerated on right leg with assistive device (rolling walker) and assistance.  No strenuous activity, heavy lifting, driving, tub bathing, or returning to work until cleared by MD.  You may shower--dressing is waterproof.  Remove dressing after post op day 7, then leave incision open to air.  Follow up with Dr. Billings to schedule an appt within 10-14 days.  If you don't have a bowel movement by post op day 3, then take Milk of Magnesia (over the counter).  If no bowel movement by at least post op day 5, then use a Dulcolax suppository (over the counter) and/or a Fleets enema--if still no bowel movement, call your MD.  Contact your doctor if you experience: fever greater than 101.5, chills, chest pain, difficulty breathing, bleeding, redness or heat around the incision. Weight bearing as tolerated on right leg with assistive device (rolling walker) and assistance.  No strenuous activity, heavy lifting, driving, tub bathing, or returning to work until cleared by MD.  You may shower--dressing is waterproof.  Remove dressing after post op day 7, then leave incision open to air.  Follow up with Dr. Billings to schedule an appt within 10-14 days.  If you don't have a bowel movement by post op day 3, then take Milk of Magnesia (over the counter).  If no bowel movement by at least post op day 5, then use a Dulcolax suppository (over the counter) and/or a Fleets enema--if still no bowel movement, call your MD.  Contact your doctor if you experience: fever greater than 101.5, chills, chest pain, difficulty breathing, bleeding, redness or heat around the incision.  Patient was hyponatremic after surgery but Na is not 135.  You can continue to trend bmp to monitor Na.

## 2017-06-07 NOTE — PROGRESS NOTE ADULT - SUBJECTIVE AND OBJECTIVE BOX
HPI:  66 year old black female with osteoarthritis right knee with moderate knee pain, deformity, decreased ROM and unsteady gait progressively increased over years, worse since August 2016 after left TKR done.  X rays confirmed diagnosis.  Symptoms worse with stairs and after prolonged imobility. Patient day #1 with moderate knee pain and malaise.      PAST MEDICAL & SURGICAL HISTORY:  Osteopenia  Arthritis  HTN (hypertension)  H/O total knee replacement, left: (8/2016)  No significant past surgical history      MEDICATIONS  (STANDING):  lactated ringers. 1000milliLiter(s) IV Continuous <Continuous>  enoxaparin Injectable 40milliGRAM(s) SubCutaneous daily  docusate sodium 100milliGRAM(s) Oral three times a day  ferrous    sulfate 325milliGRAM(s) Oral three times a day with meals  multivitamin 1Tablet(s) Oral daily  pregabalin 25milliGRAM(s) Oral three times a day  amLODIPine 10 mG/benazepril 20 mG 1Capsule(s) Oral daily    MEDICATIONS  (PRN):  acetaminophen   Tablet 650milliGRAM(s) Oral every 6 hours PRN For Temp greater than 38 C (100.4 F)  aluminum hydroxide/magnesium hydroxide/simethicone Suspension 30milliLiter(s) Oral four times a day PRN Indigestion  magnesium hydroxide Suspension 30milliLiter(s) Oral daily PRN Constipation  senna 2Tablet(s) Oral at bedtime PRN Constipation  oxyCODONE IR 5milliGRAM(s) Oral every 4 hours PRN Moderate Pain (4 - 6)  oxyCODONE IR 10milliGRAM(s) Oral every 4 hours PRN Severe Pain (7 - 10)  HYDROmorphone  Injectable 0.5milliGRAM(s) IV Push every 2 hours PRN breakthrough pain      Allergies    penicillin (Urticaria)    REVIEW OF SYSTEMS    General:  malaise	  Skin/Breast: normal  Ophthalmologic: negative  ENMT:	normal  Respiratory and Thorax: normal  Cardiovascular:	normal  Gastrointestinal:	normal  Genitourinary:	normal  Musculoskeletal: right knee swelling   Neurological:	normal  Psychiatric:	normal  Hematology/Lymphatics:	negative  Endocrine:	negative  Allergic/Immunologic:	negative      PHYSICAL EXAM:  Daily Height in cm: 165.1 (06 Jun 2017 08:22)      Vital Signs Last 24 Hrs  T(C): 36.4, Max: 36.8 (06-06 @ 08:22)  T(F): 97.6, Max: 98.3 (06-06 @ 08:22)  HR: 86 (77 - 113)  BP: 150/88 (96/61 - 159/92)  BP(mean): --  RR: 16 (12 - 19)  SpO2: 99% (96% - 100%)    Constitutional: WDWNF in NAD  Eyes: conj pale  ENMT: negative  Neck: supple  Breasts: not examined   Back: negative  Respiratory: clear to P&A  Cardiovascular: no MRGT or H  Gastrointestinal: normal bowel sounds  Genitourinary: neg  Rectal: not examined  Extremities: normal  Vascular: normal  Neurological: normal  Skin: negative  Lymph Nodes: negative  Musculoskeletal:   decreased ROM  right knee  Psychiatric: anxiety               10.3   4.5   )-----------( 238      ( 06 Jun 2017 11:52 )             30.3       06-06    139  |  104  |  7   ----------------------------<  132<H>  3.5   |  23  |  0.60    Ca    8.8      06 Jun 2017 11:52

## 2017-06-07 NOTE — DISCHARGE NOTE ADULT - CARE PLAN
Principal Discharge DX:	Primary osteoarthritis of right knee  Goal:	Improved ambulation and decreased pain after right total knee replacement 6/6/17  Instructions for follow-up, activity and diet:	Weight bearing as tolerated on right leg with assistive device (rolling walker) and assistance.  No strenuous activity, heavy lifting, driving, tub bathing, or returning to work until cleared by MD.  You may shower--dressing is waterproof.  Remove dressing after post op day 7, then leave incision open to air.  Follow up with Dr. Billings to schedule an appt within 10-14 days.  If you don't have a bowel movement by post op day 3, then take Milk of Magnesia (over the counter).  If no bowel movement by at least post op day 5, then use a Dulcolax suppository (over the counter) and/or a Fleets enema--if still no bowel movement, call your MD.  Contact your doctor if you experience: fever greater than 101.5, chills, chest pain, difficulty breathing, bleeding, redness or heat around the incision. Principal Discharge DX:	Primary osteoarthritis of right knee  Goal:	Improved ambulation and decreased pain after right total knee replacement 6/6/17  Instructions for follow-up, activity and diet:	Weight bearing as tolerated on right leg with assistive device (rolling walker) and assistance.  No strenuous activity, heavy lifting, driving, tub bathing, or returning to work until cleared by MD.  You may shower--dressing is waterproof.  Remove dressing after post op day 7, then leave incision open to air.  Follow up with Dr. Billings to schedule an appt within 10-14 days.  If you don't have a bowel movement by post op day 3, then take Milk of Magnesia (over the counter).  If no bowel movement by at least post op day 5, then use a Dulcolax suppository (over the counter) and/or a Fleets enema--if still no bowel movement, call your MD.  Contact your doctor if you experience: fever greater than 101.5, chills, chest pain, difficulty breathing, bleeding, redness or heat around the incision.  Patient was hyponatremic after surgery but Na is not 135.  You can continue to trend bmp to monitor Na.

## 2017-06-07 NOTE — PROGRESS NOTE ADULT - SUBJECTIVE AND OBJECTIVE BOX
Pt. seen at 0904  Pain Management Progress Note - [facility] [phone]    HPI:  Pt. complains of right knee pain.  Used prn pain medication minimally since surgery yesterday.            Pertinent PMH: Pain at: ___Back ___Neck_x__Knee ___Hip ___Shoulder ___ Opioid tolerance    Pain is __x sharp ____dull ___burning ___achy ___ Intensity: ____ mild ____mod ____severe     Location ___x__surgical site _____cervical _____lumbar ____abd _____upper ext__x__lower ext    Worse with __x__activity ___x_movement _____physical therapy___ Rest    Improved with __x__medication _x___rest ____physical therapy    lactated ringers.  enoxaparin Injectable  acetaminophen   Tablet  aluminum hydroxide/magnesium hydroxide/simethicone Suspension  magnesium hydroxide Suspension  senna  docusate sodium  ferrous    sulfate  multivitamin  oxyCODONE IR  oxyCODONE IR  HYDROmorphone  Injectable  pregabalin  amLODIPine 10 mG/benazepril 20 mG  acetaminophen   Tablet      ROS: Const:  ___febrile   Eyes:___ENT:___CV: ___chest pain  Resp: ____sob  GI:__-_nausea _-__vomiting __-__abd pain ___npo ___clears _+__full diet __bm  :___ Musk: __+_pain ___spasm  Skin:___ Neuro:  _-__xdmxlkso__-_zopbdmnhh____ numbness ___weakness ___paresthesia  Psych:___anxiety  Endo:___ Heme:___Allergy:___      06-07 @ 07:37904 mL/min/1.73M2          Hemoglobin: 11.2 g/dL (06-07 @ 07:30)  Hemoglobin: 10.3 g/dL (06-06 @ 11:52)        T(C): 35.8, Max: 36.5 (06-07 @ 08:28)  HR: 87 (83 - 87)  BP: 157/93 (130/83 - 157/93)  RR: 16 (14 - 18)  SpO2: 97% (97% - 100%)  Wt(kg): --     PHYSICAL EXAM:  Gen Appearance: __x_no acute distress _x__appropriate         Neuro: __x_SILT feet_x___ EOM Intact Psych: AAOX_3_, _x__mood/affect appropriate        Eyes: _x__conjunctiva WNL  __x___ Pupils equal and round        ENT: _x__ears and nose atraumatic_x__ Hearing grossly intact        Neck: ___trachea midline, no visible masses ___thyroid without palpable mass    Resp: _x__Nml WOB____No tactile fremitus ___clear to auscultation    Cardio: ___extremities free from edema ____pedal pulses palpable    GI/Abdomen: ___soft _____ Nontender______Nondistended_____HSM    Lymphatic: ___no palpable nodes in neck  ___no palpable nodes calves and feet    Skin/Wound: ___Incision, _x__Dressing c/d/i,   ____surrounding tissues soft,  ___drain/chest tube present____    Muscular: EHL _5__/5  Gastrocnemius___/5    _x__absent clubbing/cyanosis         ASSESSMENT:  This is a 66y old Female with a history of:  Family history of hypertension in sister (Sibling)  Family history of hypertension in mother (Mother)  Family history of hypertension in father (Father)  Osteopenia  Inflammatory arthritis  Primary osteoarthritis of right knee  Anticipatory anxiety  Essential hypertension  Arthritis  Knee arthroplasty  Status post total knee replacement  H/O total knee replacement, left  and right knee pain S/P right total knee replacement and is doing well.        Recommended Treatment PLAN:  1.  Oxycodone 5-10 mg po q4h prn  2.  Dilaudid 0.5 mg IV q2h prn  3.  Tylenol 975 mg po q8h  4.  Lyrica 25 mg po TID

## 2017-06-07 NOTE — PROGRESS NOTE ADULT - SUBJECTIVE AND OBJECTIVE BOX
S/P R TKA on 6/6/17    S: No acute events overnight. Pain in knee but controlled. Denies any numbness, tingling, or calf pain.     O:  AFVSS    NAD, AAOx3  Dressing c/d/i  Drain patent  Sensation intact to light touch to SP, DP, Saph, Sural, and Tib nerve distributions.   +GS, TA, EHL, FHL  2+ DP pulse  Calf soft and nontender

## 2017-06-07 NOTE — DISCHARGE NOTE ADULT - CARE PROVIDER_API CALL
Rashid Billings (MD), Orthopaedic Surgery  [address] [place] [address]  Phone: [phone]  Fax: [phone]

## 2017-06-07 NOTE — DISCHARGE NOTE ADULT - PATIENT PORTAL LINK FT
“You can access the Follow[employer] Patient Portal, offered by [facility], by registering with the following website: [email or web address]”

## 2017-06-07 NOTE — PROGRESS NOTE ADULT - ASSESSMENT
POD#1 R TKA  - Pain control  - Drain removed  - OOB with PT, WBAT  - Continue Lovenox, foot pumps, and early mobilization for DVT prophylaxis   - D/C planning, most likely rehab

## 2017-06-07 NOTE — DISCHARGE NOTE ADULT - MEDICATION SUMMARY - MEDICATIONS TO TAKE
I will START or STAY ON the medications listed below when I get home from the hospital:    oxyCODONE 5 mg oral tablet  -- 1 tab(s) by mouth every 4 hours, As needed, Moderate Pain (4 - 6)  -- Indication: For Moderate pain    oxyCODONE 10 mg oral tablet  -- 1 tab(s) by mouth every 4 hours, As needed, Severe Pain (7 - 10)  -- Indication: For severe pain    acetaminophen 325 mg oral tablet  -- 2 tab(s) by mouth every 6 hours, As needed, For Temp greater than 38 C (100.4 F)  -- Indication: For fever     CeleBREX 200 mg oral capsule  -- 1 cap(s) by mouth 2 times a day  hold for s/s of bleeding  -- Indication: For Antiinflammatory    enoxaparin  -- 40 milligram(s) subcutaneous once a day  Administer for 14 days after surgery  Last day of administration 6/21/17  -- Indication: For dvt ppx    amlodipine-benazepril 10 mg-20 mg oral capsule  -- 1 cap(s) by mouth once a day  -- Indication: For hypertension    furosemide 20 mg oral tablet  -- 1 tab(s) by mouth once a day  -- Indication: For hypertension    docusate sodium 100 mg oral capsule  -- 1 cap(s) by mouth 3 times a day  -- Indication: For constipation    senna oral tablet  -- 2 tab(s) by mouth once a day (at bedtime), As needed, Constipation  -- Indication: For constipation I will START or STAY ON the medications listed below when I get home from the hospital:    oxyCODONE 5 mg oral tablet  -- 1 tab(s) by mouth every 4 hours, As needed, Moderate Pain (4 - 6)  -- Indication: For Moderate pain    oxyCODONE 10 mg oral tablet  -- 1 tab(s) by mouth every 4 hours, As needed, Severe Pain (7 - 10)  -- Indication: For severe pain    acetaminophen 325 mg oral tablet  -- 2 tab(s) by mouth every 6 hours, As needed, For Temp greater than 38 C (100.4 F)  -- Indication: For fever     CeleBREX 200 mg oral capsule  -- 1 cap(s) by mouth 2 times a day  hold for s/s of bleeding  -- Indication: For Antiinflammatory    acetaminophen 325 mg oral tablet  -- 2 tab(s) by mouth every 6 hours, as needed, mild pain (1-3)  -- Indication: For Mild pain    enoxaparin  -- 40 milligram(s) subcutaneous once a day  Administer for 14 days after surgery  Last day of administration 6/21/17  -- Indication: For dvt ppx    amlodipine-benazepril 10 mg-20 mg oral capsule  -- 1 cap(s) by mouth once a day  -- Indication: For hypertension    furosemide 20 mg oral tablet  -- 1 tab(s) by mouth once a day  -- Indication: For hypertension    docusate sodium 100 mg oral capsule  -- 1 cap(s) by mouth 3 times a day  -- Indication: For constipation    senna oral tablet  -- 2 tab(s) by mouth once a day (at bedtime), As needed, Constipation  -- Indication: For constipation

## 2017-06-07 NOTE — DISCHARGE NOTE ADULT - MEDICATION SUMMARY - MEDICATIONS TO CHANGE
I will SWITCH the dose or number of times a day I take the medications listed below when I get home from the hospital:    Tylenol 325 mg oral capsule  --  by mouth as needed for pain    Tylenol 500 mg oral tablet  -- 2 tab(s) by mouth every 6 hours

## 2017-06-08 DIAGNOSIS — D50.0 IRON DEFICIENCY ANEMIA SECONDARY TO BLOOD LOSS (CHRONIC): ICD-10-CM

## 2017-06-08 DIAGNOSIS — E87.6 HYPOKALEMIA: ICD-10-CM

## 2017-06-08 LAB
ALBUMIN SERPL ELPH-MCNC: 2.5 G/DL — LOW (ref 3.3–5)
ALP SERPL-CCNC: 76 U/L — SIGNIFICANT CHANGE UP (ref 40–120)
ALT FLD-CCNC: 9 U/L — LOW (ref 10–45)
ANION GAP SERPL CALC-SCNC: 11 MMOL/L — SIGNIFICANT CHANGE UP (ref 5–17)
ANION GAP SERPL CALC-SCNC: 13 MMOL/L — SIGNIFICANT CHANGE UP (ref 5–17)
ANION GAP SERPL CALC-SCNC: 14 MMOL/L — SIGNIFICANT CHANGE UP (ref 5–17)
AST SERPL-CCNC: 10 U/L — SIGNIFICANT CHANGE UP (ref 10–40)
BILIRUB SERPL-MCNC: 0.6 MG/DL — SIGNIFICANT CHANGE UP (ref 0.2–1.2)
BUN SERPL-MCNC: 7 MG/DL — SIGNIFICANT CHANGE UP (ref 7–23)
BUN SERPL-MCNC: 7 MG/DL — SIGNIFICANT CHANGE UP (ref 7–23)
BUN SERPL-MCNC: 9 MG/DL — SIGNIFICANT CHANGE UP (ref 7–23)
CALCIUM SERPL-MCNC: 8.8 MG/DL — SIGNIFICANT CHANGE UP (ref 8.4–10.5)
CALCIUM SERPL-MCNC: 9.3 MG/DL — SIGNIFICANT CHANGE UP (ref 8.4–10.5)
CALCIUM SERPL-MCNC: 9.5 MG/DL — SIGNIFICANT CHANGE UP (ref 8.4–10.5)
CHLORIDE SERPL-SCNC: 88 MMOL/L — LOW (ref 96–108)
CHLORIDE SERPL-SCNC: 90 MMOL/L — LOW (ref 96–108)
CHLORIDE SERPL-SCNC: 91 MMOL/L — LOW (ref 96–108)
CHLORIDE UR-SCNC: <20 MMOL/L — SIGNIFICANT CHANGE UP
CO2 SERPL-SCNC: 22 MMOL/L — SIGNIFICANT CHANGE UP (ref 22–31)
CO2 SERPL-SCNC: 24 MMOL/L — SIGNIFICANT CHANGE UP (ref 22–31)
CO2 SERPL-SCNC: 26 MMOL/L — SIGNIFICANT CHANGE UP (ref 22–31)
CREAT ?TM UR-MCNC: 73 MG/DL — SIGNIFICANT CHANGE UP
CREAT SERPL-MCNC: 0.5 MG/DL — SIGNIFICANT CHANGE UP (ref 0.5–1.3)
CREAT SERPL-MCNC: 0.5 MG/DL — SIGNIFICANT CHANGE UP (ref 0.5–1.3)
CREAT SERPL-MCNC: 0.6 MG/DL — SIGNIFICANT CHANGE UP (ref 0.5–1.3)
GLUCOSE SERPL-MCNC: 115 MG/DL — HIGH (ref 70–99)
GLUCOSE SERPL-MCNC: 117 MG/DL — HIGH (ref 70–99)
GLUCOSE SERPL-MCNC: 99 MG/DL — SIGNIFICANT CHANGE UP (ref 70–99)
HCT VFR BLD CALC: 31.5 % — LOW (ref 34.5–45)
HGB BLD-MCNC: 10.8 G/DL — LOW (ref 11.5–15.5)
MCHC RBC-ENTMCNC: 28.6 PG — SIGNIFICANT CHANGE UP (ref 27–34)
MCHC RBC-ENTMCNC: 34.3 G/DL — SIGNIFICANT CHANGE UP (ref 32–36)
MCV RBC AUTO: 83.6 FL — SIGNIFICANT CHANGE UP (ref 80–100)
OSMOLALITY UR: 279 MOSMOL/KG — SIGNIFICANT CHANGE UP (ref 100–650)
PLATELET # BLD AUTO: 277 K/UL — SIGNIFICANT CHANGE UP (ref 150–400)
POTASSIUM SERPL-MCNC: 3.5 MMOL/L — SIGNIFICANT CHANGE UP (ref 3.5–5.3)
POTASSIUM SERPL-MCNC: 3.5 MMOL/L — SIGNIFICANT CHANGE UP (ref 3.5–5.3)
POTASSIUM SERPL-MCNC: 3.8 MMOL/L — SIGNIFICANT CHANGE UP (ref 3.5–5.3)
POTASSIUM SERPL-SCNC: 3.5 MMOL/L — SIGNIFICANT CHANGE UP (ref 3.5–5.3)
POTASSIUM SERPL-SCNC: 3.5 MMOL/L — SIGNIFICANT CHANGE UP (ref 3.5–5.3)
POTASSIUM SERPL-SCNC: 3.8 MMOL/L — SIGNIFICANT CHANGE UP (ref 3.5–5.3)
POTASSIUM UR-SCNC: 23 MMOL/L — SIGNIFICANT CHANGE UP
PROT SERPL-MCNC: 6.9 G/DL — SIGNIFICANT CHANGE UP (ref 6–8.3)
RBC # BLD: 3.77 M/UL — LOW (ref 3.8–5.2)
RBC # FLD: 14.3 % — SIGNIFICANT CHANGE UP (ref 10.3–16.9)
SODIUM SERPL-SCNC: 124 MMOL/L — LOW (ref 135–145)
SODIUM SERPL-SCNC: 125 MMOL/L — LOW (ref 135–145)
SODIUM SERPL-SCNC: 130 MMOL/L — LOW (ref 135–145)
SODIUM UR-SCNC: 20 MMOL/L — SIGNIFICANT CHANGE UP
UUN UR-MCNC: 390 MG/DL — SIGNIFICANT CHANGE UP
WBC # BLD: 5.2 K/UL — SIGNIFICANT CHANGE UP (ref 3.8–10.5)
WBC # FLD AUTO: 5.2 K/UL — SIGNIFICANT CHANGE UP (ref 3.8–10.5)

## 2017-06-08 RX ADMIN — Medication 325 MILLIGRAM(S): at 17:39

## 2017-06-08 RX ADMIN — Medication 25 MILLIGRAM(S): at 04:58

## 2017-06-08 RX ADMIN — Medication 1 TABLET(S): at 10:52

## 2017-06-08 RX ADMIN — Medication 325 MILLIGRAM(S): at 10:52

## 2017-06-08 RX ADMIN — Medication 975 MILLIGRAM(S): at 20:41

## 2017-06-08 RX ADMIN — Medication 25 MILLIGRAM(S): at 13:14

## 2017-06-08 RX ADMIN — Medication 100 MILLIGRAM(S): at 20:41

## 2017-06-08 RX ADMIN — OXYCODONE HYDROCHLORIDE 10 MILLIGRAM(S): 5 TABLET ORAL at 10:52

## 2017-06-08 RX ADMIN — ENOXAPARIN SODIUM 40 MILLIGRAM(S): 100 INJECTION SUBCUTANEOUS at 04:58

## 2017-06-08 RX ADMIN — Medication 25 MILLIGRAM(S): at 20:41

## 2017-06-08 RX ADMIN — Medication 325 MILLIGRAM(S): at 04:58

## 2017-06-08 RX ADMIN — OXYCODONE HYDROCHLORIDE 10 MILLIGRAM(S): 5 TABLET ORAL at 11:38

## 2017-06-08 RX ADMIN — Medication 100 MILLIGRAM(S): at 13:14

## 2017-06-08 RX ADMIN — OXYCODONE HYDROCHLORIDE 10 MILLIGRAM(S): 5 TABLET ORAL at 05:36

## 2017-06-08 RX ADMIN — Medication 975 MILLIGRAM(S): at 13:14

## 2017-06-08 RX ADMIN — Medication 975 MILLIGRAM(S): at 04:58

## 2017-06-08 RX ADMIN — OXYCODONE HYDROCHLORIDE 10 MILLIGRAM(S): 5 TABLET ORAL at 04:59

## 2017-06-08 RX ADMIN — Medication 100 MILLIGRAM(S): at 04:58

## 2017-06-08 NOTE — CONSULT NOTE ADULT - ASSESSMENT
65 yo female with a h/o HTN and DJD who is POD #2 from TKR, who has developed hyponatremia (Na 135 yesterday, and 125 today).  Patient appears euvolemic on exam, but would like to assess Urine Na and Urine Osm results, which are now pending.  Would d/c isotonic LR IVF, and avoid further IVF for now.  Would fluid restrict patient as you are.  This was explained to patient at length. Please repeat Serum Na this evening and call/text my cell with the result please. [phone]. Will determine further studies, such as TSH, if Na not improving.  As the the patient's HTN, continue outpt meds and monitor.  If BP's remain quite labile, would check orthostatics. Will follow with you.

## 2017-06-08 NOTE — PROGRESS NOTE ADULT - SUBJECTIVE AND OBJECTIVE BOX
ORTHO NOTE    [x ] Pt seen/examined.  [x ] Pt without any complaints/in NAD.    [ ] Pt complains of:      ROS: [ ] Fever  [ ] Chills  [ ] CP [ ] SOB [ ] Dysnea  [ ] Palpitations [ ] Cough [ ] N/V/C/D [ ] Paresthia [ ] Other     [x ] ROS  otherwise negative    .    PHYSICAL EXAM:    Vital Signs Last 24 Hrs  T(C): 36.6, Max: 36.6 (06-08 @ 04:54)  T(F): 97.9, Max: 97.9 (06-08 @ 04:54)  HR: 87 (87 - 100)  BP: 90/53 (90/53 - 172/94)  BP(mean): --  RR: 14 (14 - 16)  SpO2: 97% (97% - 98%)    I&O's Detail  I & Os for 24h ending 08 Jun 2017 07:00  =============================================  IN:    Oral Fluid: 600 ml    Total IN: 600 ml  ---------------------------------------------  OUT:    Voided: 2100 ml    Total OUT: 2100 ml  ---------------------------------------------  Total NET: -1500 ml    I & Os for current day (as of 08 Jun 2017 11:39)  =============================================  IN:    Oral Fluid: 360 ml    Total IN: 360 ml  ---------------------------------------------  OUT:    Voided: 650 ml    Total OUT: 650 ml  ---------------------------------------------  Total NET: -290 ml       CAPILLARY BLOOD GLUCOSE                  Neuro: AAOX3, no neuro deficits    Lungs: CTA, IS demonstrated    CV:    ABD: soft, nontender, bowel regimen    Ext: bilateral LE NVID, right knee dsg cdi, R LE strength 5/5, ROM exercises performed    LABS                        10.8   5.2   )-----------( 277      ( 08 Jun 2017 07:59 )             31.5                                06-08    125<L>  |  90<L>  |  7   ----------------------------<  115<H>  3.5   |  24  |  0.50    Ca    8.8      08 Jun 2017 07:59    TPro  6.9  /  Alb  2.5<L>  /  TBili  0.6  /  DBili  x   /  AST  10  /  ALT  9<L>  /  AlkPhos  76  06-08      [ ] Other Labs  [ ] None ordered            Please check or [place] when present:  •  Heart Failure:    [ ] Acute        [ ]  Acute on Chronic        [ ] Chronic         [ ] Diastolic     [ ]  Combined    •  BRITTANIE:     [ ] ATN        [ ]  Renal medullary necrosis       [ ]  Renal cortical necrosis                  [ ] Other pathological Lesion:  •  CKD:  [ ] Stage I   [ ] Stage II  [ ] Stage III    [ ]Stage IV   [ ]  CKD V   [ ]  Other/Unspecified:    •  Abdominal Nutritional Status:   [ ] Malnutrition-See Nutrition note    [ ] Cachexia   [ ]  Other        [ ] Supplement ordered:            [ ] Morbid Obesity: BMI >=40         ASSESSMENT/PLAN:      STATUS POST: pod2 R tka  Hyponatremia Na 125- fluid restriction 1000ml, encouraged po salt, urine lytes sent, f/u repeat bmp    CONTINUE:          [ ] PT- wbat    [ ] DVT PPX-lovenox 40mg daily    [ ] Pain Mgt- po meds per pain mngt c/s    [ ] Dispo plan- [facility]

## 2017-06-08 NOTE — PROGRESS NOTE ADULT - SUBJECTIVE AND OBJECTIVE BOX
S/P R TKA on 6/6/17    S: No acute events overnight. Pain in knee but controlled. Denies any numbness, tingling, or calf pain.     O:  AFVSS    NAD, AAOx3  Dressing removed, Incision c/d/i, no s/s of infection   Sensation intact to light touch to SP, DP, Saph, Sural, and Tib nerve distributions.   +GS, TA, EHL, FHL  2+ DP pulse  Calf soft and nontender

## 2017-06-08 NOTE — PROGRESS NOTE ADULT - SUBJECTIVE AND OBJECTIVE BOX
HPI:  66 year old black female with osteoarthritis right knee with moderate knee pain, deformity, decreased ROM and unsteady gait progressively increased over years, worse since August 2016 after left TKR done.  X rays confirmed diagnosis.  Symptoms worse with stairs and after prolonged imobility. Patient day #2 with moderate knee pain and malaise.    PAST MEDICAL & SURGICAL HISTORY:  Osteopenia  Arthritis  HTN (hypertension)  H/O total knee replacement, left: (8/2016)  No significant past surgical history      MEDICATIONS  (STANDING):  lactated ringers. 1000milliLiter(s) IV Continuous <Continuous>  enoxaparin Injectable 40milliGRAM(s) SubCutaneous daily  docusate sodium 100milliGRAM(s) Oral three times a day  ferrous    sulfate 325milliGRAM(s) Oral three times a day with meals  multivitamin 1Tablet(s) Oral daily  pregabalin 25milliGRAM(s) Oral three times a day  amLODIPine 10 mG/benazepril 20 mG 1Capsule(s) Oral daily  acetaminophen   Tablet 975milliGRAM(s) Oral every 8 hours    MEDICATIONS  (PRN):  acetaminophen   Tablet 650milliGRAM(s) Oral every 6 hours PRN For Temp greater than 38 C (100.4 F)  aluminum hydroxide/magnesium hydroxide/simethicone Suspension 30milliLiter(s) Oral four times a day PRN Indigestion  magnesium hydroxide Suspension 30milliLiter(s) Oral daily PRN Constipation  senna 2Tablet(s) Oral at bedtime PRN Constipation  oxyCODONE IR 5milliGRAM(s) Oral every 4 hours PRN Moderate Pain (4 - 6)  oxyCODONE IR 10milliGRAM(s) Oral every 4 hours PRN Severe Pain (7 - 10)  HYDROmorphone  Injectable 0.5milliGRAM(s) IV Push every 2 hours PRN breakthrough pain    Allergies    penicillin (Urticaria)    REVIEW OF SYSTEMS    General:  malaise	  Skin/Breast: normal  Ophthalmologic: negative  ENMT:	normal  Respiratory and Thorax: normal  Cardiovascular:	normal  Gastrointestinal:	normal  Genitourinary:	normal  Musculoskeletal: right knee swelling   Neurological:	normal  Psychiatric:	normal  Hematology/Lymphatics:	negative  Endocrine:	negative  Allergic/Immunologic:	negative      PHYSICAL EXAM:    Vital Signs Last 24 Hrs  T(C): 36.6, Max: 36.6 (06-08 @ 04:54)  T(F): 97.9, Max: 97.9 (06-08 @ 04:54)  HR: 87 (84 - 100)  BP: 169/94 (143/87 - 172/94)  BP(mean): --  RR: 16 (14 - 16)  SpO2: 98% (97% - 99%)    Constitutional: WDWNF in NAD  Eyes: conj pale  ENMT: negative  Neck: supple  Breasts: not examined   Back: negative  Respiratory: clear to P&A  Cardiovascular: no MRGT or H  Gastrointestinal: normal bowel sounds  Genitourinary: neg  Rectal: not examined  Extremities: normal  Vascular: normal  Neurological: normal  Skin: negative  Lymph Nodes: negative  Musculoskeletal:   decreased ROM  right knee  Psychiatric: anxiety                        11.2   7.0   )-----------( 269      ( 07 Jun 2017 07:30 )             32.7       06-07    135  |  94<L>  |  5<L>  ----------------------------<  117<H>  3.3<L>   |  23  |  0.40<L>    Ca    9.4      07 Jun 2017 07:30    TPro  7.7  /  Alb  3.1<L>  /  TBili  0.4  /  DBili  x   /  AST  13  /  ALT  10  /  AlkPhos  75  06-07

## 2017-06-08 NOTE — PROGRESS NOTE ADULT - SUBJECTIVE AND OBJECTIVE BOX
Pt. seen at 0909  Pain Management Progress Note - [facility] [phone]    HPI:  Pt. complains of dull pain in the right knee.  States pain medication is helpful and denies side effects from pain medication.            Pertinent PMH: Pain at: ___Back ___Neck__x_Knee ___Hip ___Shoulder ___ Opioid tolerance    Pain is ___ sharp __x__dull ___burning ___achy ___ Intensity: ____ mild ____mod ____severe     Location __x___surgical site _____cervical _____lumbar ____abd _____upper ext__x__lower ext    Worse with __x__activity __x__movement _____physical therapy___ Rest    Improved with _x___medication __x__rest ____physical therapy    lactated ringers.  enoxaparin Injectable  acetaminophen   Tablet  aluminum hydroxide/magnesium hydroxide/simethicone Suspension  magnesium hydroxide Suspension  senna  docusate sodium  ferrous    sulfate  multivitamin  oxyCODONE IR  oxyCODONE IR  HYDROmorphone  Injectable  pregabalin  amLODIPine 10 mG/benazepril 20 mG  acetaminophen   Tablet      ROS: Const:  ___febrile   Eyes:___ENT:___CV: ___chest pain  Resp: ____sob  GI:_-__nausea _-__vomiting __-__abd pain ___npo ___clears _+__full diet __bm  :___ [place]: _+__pain ___spasm  Skin:___ Neuro:  _-__qfxvyqyo__-_zdifrsnvb___-_ numbness _-__weakness ___paresthesia  Psych:___anxiety  Endo:___ Heme:___Allergy:___      06-08 @ [id number]:[id number] mL/min/1.73M2          Hemoglobin: 10.8 g/dL (06-08 @ 07:59)  Hemoglobin: 11.2 g/dL (06-07 @ 07:30)  Hemoglobin: 10.3 g/dL (06-06 @ 11:52)        T(C): 36.6, Max: 36.6 (06-08 @ 04:54)  HR: 87 (87 - 100)  BP: 90/53 (90/53 - 172/94)  RR: 14 (14 - 16)  SpO2: 97% (97% - 98%)  Wt(kg): --     PHYSICAL EXAM:  Gen Appearance: _x__no acute distress __x_appropriate         Neuro: _x__SILT feet__x__ EOM Intact Psych: AAOX3__, _x__mood/affect appropriate        Eyes: __x_conjunctiva WNL  ___x__ Pupils equal and round        ENT: _x__ears and nose atraumatic__x_ Hearing grossly intact        Neck: ___trachea midline, no visible masses ___thyroid without palpable mass    Resp: _x__Nml WOB____No tactile fremitus ___clear to auscultation    Cardio: ___extremities free from edema ____pedal pulses palpable    GI/Abdomen: _x__soft ___x__ Nontender______Nondistended_____HSM    Lymphatic: ___no palpable nodes in neck  ___no palpable nodes calves and feet    Skin/Wound: ___Incision, ___Dressing c/d/i,   ____surrounding tissues soft,  ___drain/chest tube present____    Muscular: EHL _5__/5  Gastrocnemius___/5    _x__absent clubbing/cyanosis         ASSESSMENT:  This is a 66y old Female with a history of:  Family history of hypertension in sister (Sibling)  Family history of hypertension in mother (Mother)  Family history of hypertension in father (Father)  Osteopenia  Arthritis  HTN (hypertension)  Inflammatory arthritis  Primary osteoarthritis of right knee  Hypokalemia  Anemia due to blood loss  Anticipatory anxiety  Osteopenia  Knee arthroplasty  Status post total knee replacement  H/O total knee replacement, left  and right knee pain S/P right total knee replacement and is doing a little better today.        Recommended Treatment PLAN:  1.  Oxycodone 5-10 mg po q4h prn  2.  Dilaudid 0.5 mg IV q2h prn  3.  Tylenol 975 mg po q8h  4.  Lyrica 25 mg po TID

## 2017-06-08 NOTE — CONSULT NOTE ADULT - SUBJECTIVE AND OBJECTIVE BOX
HPI:  66 y.o. F. with h/o HTN and severe Right knee DJD with pain, swelling and decreased ROM who is now POD2 from Right TKR with Dr. Billings (06 Jun 2017 06:05)    Pt was found to have a low serum Na on am labs. It was 125 when it had been 135 yesterday. Patient has been on maintenance LR IVF's.  Nurse also reports the patient has been drinking a lot of fluids.   Pt denies N/V, dizziness, H/A.      Urine studies are pending.      PAST MEDICAL & SURGICAL HISTORY:  Osteopenia  Arthritis  HTN (hypertension)  H/O total knee replacement, left: (8/2016)  No significant past surgical history        REVIEW OF SYSTEMS:    General:	 no weakness; no fevers, no chills  Skin/Breast: no rash  Respiratory and Thorax: no SOB, no cough  Cardiovascular:	No chest pain  Gastrointestinal:	 no nausea, vomiting , diarrhea  Genitourinary:	no dysuria, no difficulty urinating, no hematuria  Musculoskeletal:	no weakness, no joint swelling/pain  Neurological:	no focal weakness/numbness      MEDICATIONS  (STANDING):  enoxaparin Injectable 40milliGRAM(s) SubCutaneous daily  docusate sodium 100milliGRAM(s) Oral three times a day  ferrous    sulfate 325milliGRAM(s) Oral three times a day with meals  multivitamin 1Tablet(s) Oral daily  pregabalin 25milliGRAM(s) Oral three times a day  amLODIPine 10 mG/benazepril 20 mG 1Capsule(s) Oral daily  acetaminophen   Tablet 975milliGRAM(s) Oral every 8 hours    MEDICATIONS  (PRN):  acetaminophen   Tablet 650milliGRAM(s) Oral every 6 hours PRN For Temp greater than 38 C (100.4 F)  aluminum hydroxide/magnesium hydroxide/simethicone Suspension 30milliLiter(s) Oral four times a day PRN Indigestion  magnesium hydroxide Suspension 30milliLiter(s) Oral daily PRN Constipation  senna 2Tablet(s) Oral at bedtime PRN Constipation  oxyCODONE IR 5milliGRAM(s) Oral every 4 hours PRN Moderate Pain (4 - 6)  oxyCODONE IR 10milliGRAM(s) Oral every 4 hours PRN Severe Pain (7 - 10)  HYDROmorphone  Injectable 0.5milliGRAM(s) IV Push every 2 hours PRN breakthrough pain      Allergies    penicillin (Urticaria)    Intolerances        SOCIAL HISTORY: No h/o Tobacco     FAMILY HISTORY:  Family history of hypertension in sister (Sibling)  Family history of hypertension in mother (Mother)  Family history of hypertension in father (Father)      Vital Signs Last 24 Hrs  T(C): 36.6, Max: 36.6 (06-08 @ 04:54)  T(F): 97.9, Max: 97.9 (06-08 @ 04:54)  HR: 85 (79 - 100)  BP: 114/74 (90/53 - 172/94)  BP(mean): --  RR: 14 (14 - 16)  SpO2: 94% (94% - 98%)  I & Os for 24h ending 06-08 @ 07:00  =============================================  IN: 600 ml / OUT: 2100 ml / NET: -1500 ml    I & Os for current day (as of 06-08 @ 15:43)  =============================================  IN: 720 ml / OUT: 1150 ml / NET: -430 ml    PHYSICAL EXAM:  Constitutional: NAD  Eyes:CHRISTOPHE, EOMI  Moist O/P  Neck:no JVD, no lymphadenopathy, supple  Respiratory: CTA mauri  Cardiovascular: S1S2 RRR, no murmurs, no rub  Gastrointestinal:soft, (+) BS, NT, ND  Extremities:no e/e/c  +brace on Right leg.  Vascular: DP Pulse:	right normal; left normal            LABS:                        10.8   5.2   )-----------( 277      ( 08 Jun 2017 07:59 )             31.5     06-08    124<L>  |  88<L>  |  7   ----------------------------<  117<H>  3.5   |  22  |  0.50    Ca    9.3      08 Jun 2017 12:33    TPro  6.9  /  Alb  2.5<L>  /  TBili  0.6  /  DBili  x   /  AST  10  /  ALT  9<L>  /  AlkPhos  76  06-08

## 2017-06-08 NOTE — PROGRESS NOTE ADULT - ASSESSMENT
POD#2 R TKA  - Pain control  - Dressing changed  - OOB with PT, WBAT  - Continue Lovenox, foot pumps, and early mobilization for DVT prophylaxis   - OK to D/C to rehab today from ortho standpoint

## 2017-06-09 VITALS
RESPIRATION RATE: 16 BRPM | HEART RATE: 91 BPM | DIASTOLIC BLOOD PRESSURE: 75 MMHG | TEMPERATURE: 98 F | OXYGEN SATURATION: 100 % | SYSTOLIC BLOOD PRESSURE: 111 MMHG

## 2017-06-09 LAB
ALBUMIN SERPL ELPH-MCNC: 2.4 G/DL — LOW (ref 3.3–5)
ALP SERPL-CCNC: 73 U/L — SIGNIFICANT CHANGE UP (ref 40–120)
ALT FLD-CCNC: 14 U/L — SIGNIFICANT CHANGE UP (ref 10–45)
ANION GAP SERPL CALC-SCNC: 10 MMOL/L — SIGNIFICANT CHANGE UP (ref 5–17)
AST SERPL-CCNC: 17 U/L — SIGNIFICANT CHANGE UP (ref 10–40)
BILIRUB SERPL-MCNC: 0.4 MG/DL — SIGNIFICANT CHANGE UP (ref 0.2–1.2)
BUN SERPL-MCNC: 8 MG/DL — SIGNIFICANT CHANGE UP (ref 7–23)
CALCIUM SERPL-MCNC: 9.1 MG/DL — SIGNIFICANT CHANGE UP (ref 8.4–10.5)
CHLORIDE SERPL-SCNC: 99 MMOL/L — SIGNIFICANT CHANGE UP (ref 96–108)
CO2 SERPL-SCNC: 26 MMOL/L — SIGNIFICANT CHANGE UP (ref 22–31)
CREAT SERPL-MCNC: 0.6 MG/DL — SIGNIFICANT CHANGE UP (ref 0.5–1.3)
GLUCOSE SERPL-MCNC: 96 MG/DL — SIGNIFICANT CHANGE UP (ref 70–99)
HCT VFR BLD CALC: 31.3 % — LOW (ref 34.5–45)
HGB BLD-MCNC: 10.6 G/DL — LOW (ref 11.5–15.5)
MCHC RBC-ENTMCNC: 28.4 PG — SIGNIFICANT CHANGE UP (ref 27–34)
MCHC RBC-ENTMCNC: 33.9 G/DL — SIGNIFICANT CHANGE UP (ref 32–36)
MCV RBC AUTO: 83.9 FL — SIGNIFICANT CHANGE UP (ref 80–100)
PLATELET # BLD AUTO: 255 K/UL — SIGNIFICANT CHANGE UP (ref 150–400)
POTASSIUM SERPL-MCNC: 4 MMOL/L — SIGNIFICANT CHANGE UP (ref 3.5–5.3)
POTASSIUM SERPL-SCNC: 4 MMOL/L — SIGNIFICANT CHANGE UP (ref 3.5–5.3)
PROT SERPL-MCNC: 6.7 G/DL — SIGNIFICANT CHANGE UP (ref 6–8.3)
RBC # BLD: 3.73 M/UL — LOW (ref 3.8–5.2)
RBC # FLD: 14.5 % — SIGNIFICANT CHANGE UP (ref 10.3–16.9)
SODIUM SERPL-SCNC: 135 MMOL/L — SIGNIFICANT CHANGE UP (ref 135–145)
WBC # BLD: 4.4 K/UL — SIGNIFICANT CHANGE UP (ref 3.8–10.5)
WBC # FLD AUTO: 4.4 K/UL — SIGNIFICANT CHANGE UP (ref 3.8–10.5)

## 2017-06-09 PROCEDURE — 88300 SURGICAL PATH GROSS: CPT

## 2017-06-09 PROCEDURE — 84133 ASSAY OF URINE POTASSIUM: CPT

## 2017-06-09 PROCEDURE — 84300 ASSAY OF URINE SODIUM: CPT

## 2017-06-09 PROCEDURE — 80048 BASIC METABOLIC PNL TOTAL CA: CPT

## 2017-06-09 PROCEDURE — 85027 COMPLETE CBC AUTOMATED: CPT

## 2017-06-09 PROCEDURE — 82570 ASSAY OF URINE CREATININE: CPT

## 2017-06-09 PROCEDURE — C1713: CPT

## 2017-06-09 PROCEDURE — 82436 ASSAY OF URINE CHLORIDE: CPT

## 2017-06-09 PROCEDURE — C1776: CPT

## 2017-06-09 PROCEDURE — 97116 GAIT TRAINING THERAPY: CPT

## 2017-06-09 PROCEDURE — 84540 ASSAY OF URINE/UREA-N: CPT

## 2017-06-09 PROCEDURE — 97161 PT EVAL LOW COMPLEX 20 MIN: CPT

## 2017-06-09 PROCEDURE — 36415 COLL VENOUS BLD VENIPUNCTURE: CPT

## 2017-06-09 PROCEDURE — C1889: CPT

## 2017-06-09 PROCEDURE — 80053 COMPREHEN METABOLIC PANEL: CPT

## 2017-06-09 PROCEDURE — 73560 X-RAY EXAM OF KNEE 1 OR 2: CPT

## 2017-06-09 PROCEDURE — 83935 ASSAY OF URINE OSMOLALITY: CPT

## 2017-06-09 RX ORDER — ACETAMINOPHEN 500 MG
3 TABLET ORAL
Qty: 0 | Refills: 0 | COMMUNITY
Start: 2017-06-09

## 2017-06-09 RX ADMIN — OXYCODONE HYDROCHLORIDE 10 MILLIGRAM(S): 5 TABLET ORAL at 17:17

## 2017-06-09 RX ADMIN — Medication 25 MILLIGRAM(S): at 13:36

## 2017-06-09 RX ADMIN — Medication 1 TABLET(S): at 13:36

## 2017-06-09 RX ADMIN — Medication 975 MILLIGRAM(S): at 05:26

## 2017-06-09 RX ADMIN — Medication 100 MILLIGRAM(S): at 13:36

## 2017-06-09 RX ADMIN — ENOXAPARIN SODIUM 40 MILLIGRAM(S): 100 INJECTION SUBCUTANEOUS at 05:26

## 2017-06-09 RX ADMIN — Medication 325 MILLIGRAM(S): at 17:17

## 2017-06-09 RX ADMIN — Medication 25 MILLIGRAM(S): at 05:26

## 2017-06-09 RX ADMIN — Medication 975 MILLIGRAM(S): at 13:36

## 2017-06-09 RX ADMIN — Medication 325 MILLIGRAM(S): at 13:36

## 2017-06-09 RX ADMIN — Medication 325 MILLIGRAM(S): at 07:45

## 2017-06-09 RX ADMIN — Medication 100 MILLIGRAM(S): at 05:26

## 2017-06-09 NOTE — DIETITIAN INITIAL EVALUATION ADULT. - OTHER INFO
65y/o F s/p R TKA. Pt seen resting in bed with daughter at bedside. Daughter reports pt with a decreased appetite post-op. Pt recalls eating ~50% of breakfast today. Denies N/V/D, pain, and mechanical issues. No BM yet; continue bowel regimen and encouraged fiber intake and ambulation. Pt with hyponatremia and placed on a FR. Discussed adequate intake post-op and strategies to help optimize nutrition. Pt refusing ONS. Will follow and monitor meal intake and tolerance.

## 2017-06-09 NOTE — PROGRESS NOTE ADULT - SUBJECTIVE AND OBJECTIVE BOX
HPI:  66 y.o. F. with h/o HTN and severe Right knee DJD with pain, swelling and decreased ROM who is now POD2 from Right TKR with Dr. Billings (06 Jun 2017 06:05)    Pt was found to have a low serum Na on yesterday labs. It was 125 when it had been 135 yesterday. Patient had been on maintenance LR IVF's.  Nurse also reported the patient had been drinking a lot of fluids.     In the last 24 hours the patient has been off IVF and on fluid restriction with a return of her serum Na to 135.      PAST MEDICAL & SURGICAL HISTORY:  Osteopenia  Arthritis  HTN (hypertension)  H/O total knee replacement, left: (8/2016)  No significant past surgical history      MEDICATIONS  (STANDING):  enoxaparin Injectable 40milliGRAM(s) SubCutaneous daily  docusate sodium 100milliGRAM(s) Oral three times a day  ferrous    sulfate 325milliGRAM(s) Oral three times a day with meals  multivitamin 1Tablet(s) Oral daily  pregabalin 25milliGRAM(s) Oral three times a day  amLODIPine 10 mG/benazepril 20 mG 1Capsule(s) Oral daily  acetaminophen   Tablet 975milliGRAM(s) Oral every 8 hours    MEDICATIONS  (PRN):  acetaminophen   Tablet 650milliGRAM(s) Oral every 6 hours PRN For Temp greater than 38 C (100.4 F)  aluminum hydroxide/magnesium hydroxide/simethicone Suspension 30milliLiter(s) Oral four times a day PRN Indigestion  magnesium hydroxide Suspension 30milliLiter(s) Oral daily PRN Constipation  senna 2Tablet(s) Oral at bedtime PRN Constipation  oxyCODONE IR 5milliGRAM(s) Oral every 4 hours PRN Moderate Pain (4 - 6)  oxyCODONE IR 10milliGRAM(s) Oral every 4 hours PRN Severe Pain (7 - 10)  HYDROmorphone  Injectable 0.5milliGRAM(s) IV Push every 2 hours PRN breakthrough pain      Allergies    penicillin (Urticaria)    Intolerances        SOCIAL HISTORY: No Tob    FAMILY HISTORY:  Family history of hypertension in sister (Sibling)  Family history of hypertension in mother (Mother)  Family history of hypertension in father (Father)      Vital Signs Last 24 Hrs  T(C): 36.8, Max: 36.9 (06-09 @ 05:21)  T(F): 98.3, Max: 98.4 (06-09 @ 05:21)  HR: 105 (85 - 105)  BP: 102/66 (102/66 - 146/93)  BP(mean): --  RR: 16 (16 - 17)  SpO2: 98% (98% - 99%)    I & Os for current day (as of 06-09 @ 11:34)  =============================================  IN: 720 ml / OUT: 2600 ml / NET: -1880 ml      LABS:                        10.6   4.4   )-----------( 255      ( 09 Jun 2017 07:20 )             31.3     06-09    135  |  99  |  8   ----------------------------<  96  4.0   |  26  |  0.60    Ca    9.1      09 Jun 2017 07:20    TPro  6.7  /  Alb  2.4<L>  /  TBili  0.4  /  DBili  x   /  AST  17  /  ALT  14  /  AlkPhos  73  06-09

## 2017-06-09 NOTE — PROGRESS NOTE ADULT - SUBJECTIVE AND OBJECTIVE BOX
S/P R TKA on 6/6/17    S: No acute events overnight. Pain in knee but controlled. Denies any numbness, tingling, or calf pain.     O:  AFVSS    NAD, AAOx3  Dressing c/d/i, no s/s of infection   Sensation intact to light touch to SP, DP, Saph, Sural, and Tib nerve distributions.   +GS, TA, EHL, FHL  2+ DP pulse  Calf soft and nontender

## 2017-06-09 NOTE — PROGRESS NOTE ADULT - ASSESSMENT
POD#2 R TKA  - Pain control  - Follow nephro recs for hyponatremia. Na 130 this am   - Continue Dressing  - OOB with PT, WBAT  - Continue Lovenox, foot pumps, and early mobilization for DVT prophylaxis   - OK to D/C to rehab when medically cleared POD#3 R TKA  - Pain control  - Follow nephro recs for hyponatremia. Na 130 this am   - Continue Dressing  - OOB with PT, WBAT  - Continue Lovenox, foot pumps, and early mobilization for DVT prophylaxis   - OK to D/C to rehab when medically cleared

## 2017-06-09 NOTE — PROGRESS NOTE ADULT - SUBJECTIVE AND OBJECTIVE BOX
ORTHO NOTE    [x ] Pt seen/examined.  [x ] Pt without any complaints/in NAD.    [ ] Pt complains of:      ROS: [ ] Fever  [ ] Chills  [ ] CP [ ] SOB [ ] Dysnea  [ ] Palpitations [ ] Cough [ ] N/V/C/D [ ] Paresthia [ ] Other     [x ] ROS  otherwise negative    .    PHYSICAL EXAM:    Vital Signs Last 24 Hrs  T(C): 36.8, Max: 36.9 (06-09 @ 05:21)  T(F): 98.3, Max: 98.4 (06-09 @ 05:21)  HR: 105 (85 - 105)  BP: 102/66 (102/66 - 146/93)  BP(mean): --  RR: 16 (16 - 17)  SpO2: 98% (98% - 99%)    I&O's Detail    I & Os for current day (as of 09 Jun 2017 11:34)  =============================================  IN:    Oral Fluid: 720 ml    Total IN: 720 ml  ---------------------------------------------  OUT:    Voided: 2600 ml    Total OUT: 2600 ml  ---------------------------------------------  Total NET: -1880 ml       CAPILLARY BLOOD GLUCOSE                  Neuro: AAOX3    Lungs: CTA, IS demonstrated    CV:    ABD: soft, nontender, bowel regimen    Ext: right knee dsg cdi, R LE NVID strength 5/5    LABS                        10.6   4.4   )-----------( 255      ( 09 Jun 2017 07:20 )             31.3                                06-09    135  |  99  |  8   ----------------------------<  96  4.0   |  26  |  0.60    Ca    9.1      09 Jun 2017 07:20    TPro  6.7  /  Alb  2.4<L>  /  TBili  0.4  /  DBili  x   /  AST  17  /  ALT  14  /  AlkPhos  73  06-09      [ ] Other Labs  [ ] None ordered            Please check or [place] when present:  •  Heart Failure:    [ ] Acute        [ ]  Acute on Chronic        [ ] Chronic         [ ] Diastolic     [ ]  Combined    •  BRITTANIE:     [ ] ATN        [ ]  Renal medullary necrosis       [ ]  Renal cortical necrosis                  [ ] Other pathological Lesion:  •  CKD:  [ ] Stage I   [ ] Stage II  [ ] Stage III    [ ]Stage IV   [ ]  CKD V   [ ]  Other/Unspecified:    •  Abdominal Nutritional Status:   [ ] Malnutrition-See Nutrition note    [ ] Cachexia   [ ]  Other        [ ] Supplement ordered:            [ ] Morbid Obesity: BMI >=40         ASSESSMENT/PLAN:      STATUS POST: R TKA    CONTINUE:          [ ] PT- wbat    [ ] DVT PPX- lovenox 40mg q d, scd boots    [ ] Pain Mgt- po meds    [ ] Dispo plan- [facility]

## 2017-06-09 NOTE — PROGRESS NOTE ADULT - ASSESSMENT
67 yo female with a h/o HTN and DJD who is POD #3 from TKR, who had developed hyponatremia which has now resolved.  Please d/c fluid restriction and inform patient she can drink "normally".  As for her HTN, current BP is on the low side for her (102/66).  Would check orthostatics before d/c and ensure pt is not dizzy on standing. She should follow-up with her PCP upon d/c.

## 2017-06-16 DIAGNOSIS — F41.1 GENERALIZED ANXIETY DISORDER: ICD-10-CM

## 2017-06-16 DIAGNOSIS — M85.80 OTHER SPECIFIED DISORDERS OF BONE DENSITY AND STRUCTURE, UNSPECIFIED SITE: ICD-10-CM

## 2017-06-16 DIAGNOSIS — E87.6 HYPOKALEMIA: ICD-10-CM

## 2017-06-16 DIAGNOSIS — I10 ESSENTIAL (PRIMARY) HYPERTENSION: ICD-10-CM

## 2017-06-16 DIAGNOSIS — D50.0 IRON DEFICIENCY ANEMIA SECONDARY TO BLOOD LOSS (CHRONIC): ICD-10-CM

## 2017-06-16 DIAGNOSIS — M17.11 UNILATERAL PRIMARY OSTEOARTHRITIS, RIGHT KNEE: ICD-10-CM

## 2017-06-16 DIAGNOSIS — E87.1 HYPO-OSMOLALITY AND HYPONATREMIA: ICD-10-CM

## 2017-06-16 DIAGNOSIS — Z88.0 ALLERGY STATUS TO PENICILLIN: ICD-10-CM

## 2017-06-26 ENCOUNTER — APPOINTMENT (OUTPATIENT)
Dept: ORTHOPEDIC SURGERY | Facility: CLINIC | Age: 67
End: 2017-06-26

## 2017-06-26 VITALS — BODY MASS INDEX: 19.55 KG/M2 | WEIGHT: 129 LBS | HEIGHT: 68 IN

## 2017-06-26 RX ORDER — NAPROXEN 500 MG/1
500 TABLET ORAL
Qty: 60 | Refills: 0 | Status: ACTIVE | COMMUNITY
Start: 2017-02-28

## 2017-06-26 RX ORDER — FUROSEMIDE 20 MG/1
20 TABLET ORAL
Qty: 30 | Refills: 0 | Status: ACTIVE | COMMUNITY
Start: 2017-02-28

## 2017-06-26 RX ORDER — AMLODIPINE BESYLATE AND BENAZEPRIL HYDROCHLORIDE 10; 20 MG/1; MG/1
10-20 CAPSULE ORAL
Qty: 30 | Refills: 0 | Status: ACTIVE | COMMUNITY
Start: 2016-12-07

## 2017-07-19 ENCOUNTER — APPOINTMENT (OUTPATIENT)
Dept: ORTHOPEDIC SURGERY | Facility: CLINIC | Age: 67
End: 2017-07-19

## 2017-07-19 DIAGNOSIS — Z78.9 OTHER SPECIFIED HEALTH STATUS: ICD-10-CM

## 2017-07-19 DIAGNOSIS — Z96.651 PRESENCE OF RIGHT ARTIFICIAL KNEE JOINT: ICD-10-CM

## 2017-07-19 DIAGNOSIS — Z87.39 PERSONAL HISTORY OF OTHER DISEASES OF THE MUSCULOSKELETAL SYSTEM AND CONNECTIVE TISSUE: ICD-10-CM

## 2017-07-19 DIAGNOSIS — Z86.79 PERSONAL HISTORY OF OTHER DISEASES OF THE CIRCULATORY SYSTEM: ICD-10-CM

## 2017-07-19 RX ORDER — CLINDAMYCIN HYDROCHLORIDE 300 MG/1
300 CAPSULE ORAL
Qty: 10 | Refills: 0 | Status: ACTIVE | COMMUNITY
Start: 2017-07-19 | End: 1900-01-01

## 2017-08-30 ENCOUNTER — OTHER (OUTPATIENT)
Age: 67
End: 2017-08-30

## 2017-08-30 ENCOUNTER — APPOINTMENT (OUTPATIENT)
Dept: ORTHOPEDIC SURGERY | Facility: CLINIC | Age: 67
End: 2017-08-30
Payer: MEDICARE

## 2017-08-30 DIAGNOSIS — M17.0 BILATERAL PRIMARY OSTEOARTHRITIS OF KNEE: ICD-10-CM

## 2017-08-30 DIAGNOSIS — Z96.652 PRESENCE OF LEFT ARTIFICIAL KNEE JOINT: ICD-10-CM

## 2017-08-30 DIAGNOSIS — Z96.651 PRESENCE OF RIGHT ARTIFICIAL KNEE JOINT: ICD-10-CM

## 2017-08-30 PROCEDURE — 99024 POSTOP FOLLOW-UP VISIT: CPT

## 2017-08-30 PROCEDURE — 73562 X-RAY EXAM OF KNEE 3: CPT | Mod: 50

## 2017-09-22 ENCOUNTER — APPOINTMENT (OUTPATIENT)
Dept: ORTHOPEDIC SURGERY | Facility: CLINIC | Age: 67
End: 2017-09-22
Payer: MEDICARE

## 2017-09-22 VITALS — WEIGHT: 135 LBS | HEIGHT: 66 IN | BODY MASS INDEX: 21.69 KG/M2

## 2017-09-22 DIAGNOSIS — M19.029 PRIMARY OSTEOARTHRITIS, UNSPECIFIED ELBOW: ICD-10-CM

## 2017-09-22 DIAGNOSIS — M06.9 RHEUMATOID ARTHRITIS, UNSPECIFIED: ICD-10-CM

## 2017-09-22 PROCEDURE — 99214 OFFICE O/P EST MOD 30 MIN: CPT

## 2017-09-22 PROCEDURE — 73080 X-RAY EXAM OF ELBOW: CPT | Mod: 50

## 2017-12-06 ENCOUNTER — APPOINTMENT (OUTPATIENT)
Dept: ORTHOPEDIC SURGERY | Facility: CLINIC | Age: 67
End: 2017-12-06

## 2018-09-25 ENCOUNTER — RESULT REVIEW (OUTPATIENT)
Age: 68
End: 2018-09-25

## 2018-10-19 NOTE — PATIENT PROFILE ADULT. - NS PRO AD PATIENT TYPE
Pre-Visit Chart Review  For Appointment Scheduled on 10-25-18    Health Maintenance Due   Topic Date Due    TETANUS VACCINE  06/09/1994    Influenza Vaccine  08/01/2018                   
Health Care Proxy (HCP)

## 2019-08-21 ENCOUNTER — APPOINTMENT (OUTPATIENT)
Dept: ORTHOPEDIC SURGERY | Facility: CLINIC | Age: 69
End: 2019-08-21

## 2021-11-23 NOTE — H&P ADULT - NSHPPOASURGSITEINCISION_GEN_ALL_CORE
Yes Yes Yes Yes Yes Yes Yes no Yes Yes Yes Yes Yes Yes Yes Yes Yes Yes Yes Yes Yes Yes Yes Yes Yes Yes Yes Yes

## 2023-11-24 NOTE — DISCHARGE NOTE ADULT - REASON FOR ADMISSION
Physical Therapy Visit    Visit Type: Daily Treatment Note  Visit: 11  Referring Provider: Zaria Keller MD  Medical Diagnosis (from order): H81.12 - Benign paroxysmal positional vertigo of left ear   Patient alert and oriented X3.    SUBJECTIVE                                                                                                               Jarett reports that she is fatigued today.     Notes that it was reassuring to go to the ED following her previous session and to know that what she is feeling is probably her dizziness/vertigo. Notes that she was prescribed Meclizine and has been taking it 2x/day since going to the ED. Notes that she feels 100% better with the medication. Notes that she has not had any dizziness since starting the Meclizine. Denies side effects of the medication.     Notes that she does not have symptoms in sitting. Notes that she continues to be fearful of falling when walking. Notes that she feels off balance. Notes that she takes 3-4 steps and then feels that she feels that she is walking fast like she is walking/running too fast. Can't control her stepping; has hold the chair or the wall in front of her.     Notes that her pain in her shoulders \"comes and goes.\" Notes that the pain in the right shoulder and arm is on and off; not related to movement. Can be at rest.     An [employer] video  was utilized during this session using the patient's primary/preferred language: Khmer; [place], [id number].     OBJECTIVE                                                                                                                     Observation   -appropriate LE coordination during seated LE coordination testing: heel taps, toe taps    -note improve stride length with use of handrail support           Ambulation / Gait  - Assistive device: single point cane  - Assist Level: stand by assist  - Surface: even  - Description: decreased deangelo/pace, decreased step length right,  Right knee severe Arthritis with pain, swelling and decreased motion for Right TKR decreased step length left, decreased arm swing left, decreased arm swing right and wide base of support           Treatment     Therapeutic Activity  -instruction in potential influences of Meclizine medication on the vestibular system   -instruction in roles and goals of vestibular system habituation training and balance training   -perform skilled coordination testing to facilitate optimal patient care  -seated marching  -sated LAQ  -seated heel taps, toe taps   -progress above with simultaneous and alternating LE AROM  -instruction in appropriate activity modifications to facilitate optimal activity tolerance and patient safety    Neuromuscular Re-Education  Active instruction on the following using teach-back method. Patient able to demonstrate all items listed below:  -anterior-posterior weight shift over base of support standing  -lateral weight shift over base of support standing    - Seated Gaze Stabilization with Head Nod  - 2-3 x daily - 7 x weekly - 2 sets - 30 repetitions   -progress with standing  - Seated Gaze Stabilization with Head Rotation  - 2-3 x daily - 7 x weekly - 2 sets - 30 repetitions   -progress with standing  - Forward Backward Weight Shift with Unilateral Counter Support  - 2-3 x daily - 7 x weekly - 2-3 sets - 10-15 reps  - Side to Side Weight Shift with Unilateral Counter Support  - 2-3 x daily - 7 x weekly - 2-3 sets - 10-15 reps  - Standing with Head Nod  - 2-3 x daily - 7 x weekly - 2-3 sets - 10 reps  - Narrow Stance with Eyes Closed and Head Nods  - 2-3 x daily - 7 x weekly - 2-3 sets - 10 reps  - Romberg Stance with Head Rotation  - 2-3 x daily - 7 x weekly - 2-3 sets - 10 reps  - Narrow Stance with Eyes Closed and Head Rotation  - 2-3 x daily - 7 x weekly - 2-3 sets - 10 repetitions   -progress all the above with cognitive challenge      Gait Training  Active instruction on the following using teach-back method. Patient able to demonstrate all items listed below:  -active  instruction in gait training with use of SPC over level surfaces  ~instruction for increased step length  -gait training with use of SPC and contralateral UE support on railing to facilitate optimal stride length  ~cues for improved initial contact heel strike, toe-off, step length, and base of support       Home Exercise Program  Access Code: [email or web address]  URL: [email or web address]  Date: 11/24/2023  Prepared by: Ann Townsend    Exercises  - Correct Seated Posture  - 2-3 x daily - 7 x weekly - 3 sets - 10 reps - 3-5 seconds hold  - Seated Scapular Retraction  - 2-3 x daily - 7 x weekly - 3 sets - 10 reps - 3-5 seconds hold  - Seated Elbow Extension and Shoulder External Rotation AAROM at Table with Towel  - 2-3 x daily - 7 x weekly - 3 sets - 10 reps  - Seated Elbow Flexion Shoulder Internal Rotation AAROM at Table with Towel  - 1 x daily - 7 x weekly - 3 sets - 10 reps  - Seated Shoulder External Rotation  - 2-3 x daily - 7 x weekly - 3 sets - 10 reps  - Standing Isometric Shoulder Abduction with Doorway  - 1-2 x daily - 7 x weekly - 2-3 sets - 10 reps - 3-5 seconds hold  - Standing Isometric Shoulder Extension at Table  - 1-2 x daily - 7 x weekly - 2-3 sets - 10 reps - 3-5 seconds hold  - Side Stepping with Counter Support  - 1-2 x daily - 7 x weekly - 2-3 sets - 10 reps  - Standing Hip Extension with Counter Support  - 1-2 x daily - 7 x weekly - 2-3 sets - 10 reps  - Seated Shoulder Flexion AAROM with Pulley Behind  - 1 x daily - 7 x weekly - 3 sets - 10 reps  - Seated Shoulder Abduction AAROM with Pulley Behind  - 1 x daily - 7 x weekly - 3 sets - 10 reps  - Standing External Rotation in Abduction AAROM with Pulley   - 1 x daily - 7 x weekly - 3 sets - 10 reps  - Standing Shoulder Abduction AAROM with Pulley at Side  - 1 x daily - 7 x weekly - 3 sets - 10 reps  - Standing Shoulder Flexion AAROM with Pulley Behind  - 1 x daily - 7 x weekly - 3 sets - 10 reps  - Standing Shoulder Flexion  AAROM with Arms Bent and Pulley Behind  - 1 x daily - 7 x weekly - 3 sets - 10 reps  - Seated Gaze Stabilization with Head Nod  - 2-3 x daily - 7 x weekly - 2 sets - 30 reps  - Seated Gaze Stabilization with Head Rotation  - 2-3 x daily - 7 x weekly - 2 sets - 30 reps  - Forward Backward Weight Shift with Unilateral Counter Support  - 2-3 x daily - 7 x weekly - 2-3 sets - 10-15 reps  - Side to Side Weight Shift with Unilateral Counter Support  - 2-3 x daily - 7 x weekly - 2-3 sets - 10-15 reps  - Standing with Head Nod  - 2-3 x daily - 7 x weekly - 2-3 sets - 10 reps  - Narrow Stance with Eyes Closed and Head Nods  - 2-3 x daily - 7 x weekly - 2-3 sets - 10 reps  - Romberg Stance with Head Rotation  - 2-3 x daily - 7 x weekly - 2-3 sets - 10 reps  - Narrow Stance with Eyes Closed and Head Rotation  - 2-3 x daily - 7 x weekly - 2-3 sets - 10 reps    Patient Education  - [employer] Tape Wearing Guidelines  - Heat  - Heat/ Cold Application  - FYWB: Cupping Therapy [id number]      ASSESSMENT                                                                                                            Note appropriate LE coordination in sitting; note impaired LE coordination and weight shift in standing, which is assessed to be resulting from impaired balance and righting reactions and contributing to impaired gait. Note improved step length with repetition of gait training activities. Patient is motivated to participate and improve. Patient will continue to functionally benefit from active participation in skilled physical therapy interventions to facilitate optimal management of dizziness and pain and to facilitate optimal functional balance and gait.  Pain after session: does not rate.  Education:   - Results of above outlined education: Verbalizes understanding, Demonstrates understanding and Needs reinforcement    PLAN                                                                                                                            Suggestions for next session as indicated: Progress per plan of care       Therapy procedure time and total treatment time can be found documented on the Time Entry flowsheet

## 2024-08-12 NOTE — ASU PREOP CHECKLIST - TEMPERATURE IN FAHRENHEIT (DEGREES F)
----- Message from Amanda Purvis RN sent at 8/12/2024  9:14 AM CDT -----  UC showing no growth.  See e-advice for pt notification.   
98.3

## 2025-07-16 ENCOUNTER — EMERGENCY (EMERGENCY)
Facility: HOSPITAL | Age: 75
LOS: 0 days | Discharge: HOME | End: 2025-07-16
Payer: SELF-PAY

## 2025-07-16 VITALS
WEIGHT: 164.02 LBS | TEMPERATURE: 98 F | SYSTOLIC BLOOD PRESSURE: 146 MMHG | RESPIRATION RATE: 20 BRPM | DIASTOLIC BLOOD PRESSURE: 88 MMHG | HEART RATE: 88 BPM | HEIGHT: 67 IN | OXYGEN SATURATION: 100 %

## 2025-07-16 VITALS
DIASTOLIC BLOOD PRESSURE: 88 MMHG | SYSTOLIC BLOOD PRESSURE: 143 MMHG | TEMPERATURE: 98 F | OXYGEN SATURATION: 97 % | HEART RATE: 84 BPM | RESPIRATION RATE: 12 BRPM

## 2025-07-16 LAB
ANION GAP SERPL CALC-SCNC: 7 MMOL/L — SIGNIFICANT CHANGE UP (ref 5–17)
BUN SERPL-MCNC: 14 MG/DL — SIGNIFICANT CHANGE UP (ref 7–23)
CALCIUM SERPL-MCNC: 10 MG/DL — SIGNIFICANT CHANGE UP (ref 8.5–10.1)
CHLORIDE SERPL-SCNC: 110 MMOL/L — HIGH (ref 96–108)
CO2 SERPL-SCNC: 25 MMOL/L — SIGNIFICANT CHANGE UP (ref 22–31)
CREAT SERPL-MCNC: 0.85 MG/DL — SIGNIFICANT CHANGE UP (ref 0.5–1.3)
EGFR: 71 ML/MIN/1.73M2 — SIGNIFICANT CHANGE UP
EGFR: 71 ML/MIN/1.73M2 — SIGNIFICANT CHANGE UP
GLUCOSE SERPL-MCNC: 106 MG/DL — HIGH (ref 70–99)
HCT VFR BLD CALC: 39.6 % — SIGNIFICANT CHANGE UP (ref 34.5–45)
HGB BLD-MCNC: 13 G/DL — SIGNIFICANT CHANGE UP (ref 11.5–15.5)
MCHC RBC-ENTMCNC: 30.1 PG — SIGNIFICANT CHANGE UP (ref 27–34)
MCHC RBC-ENTMCNC: 32.8 G/DL — SIGNIFICANT CHANGE UP (ref 32–36)
MCV RBC AUTO: 91.7 FL — SIGNIFICANT CHANGE UP (ref 80–100)
NRBC BLD AUTO-RTO: 0 /100 WBCS — SIGNIFICANT CHANGE UP (ref 0–0)
PLATELET # BLD AUTO: 259 K/UL — SIGNIFICANT CHANGE UP (ref 150–400)
POTASSIUM SERPL-MCNC: 3.3 MMOL/L — LOW (ref 3.5–5.3)
POTASSIUM SERPL-SCNC: 3.3 MMOL/L — LOW (ref 3.5–5.3)
RBC # BLD: 4.32 M/UL — SIGNIFICANT CHANGE UP (ref 3.8–5.2)
RBC # FLD: 14.8 % — HIGH (ref 10.3–14.5)
SODIUM SERPL-SCNC: 142 MMOL/L — SIGNIFICANT CHANGE UP (ref 135–145)
TROPONIN I, HIGH SENSITIVITY RESULT: 13.6 NG/L — SIGNIFICANT CHANGE UP
WBC # BLD: 5.47 K/UL — SIGNIFICANT CHANGE UP (ref 3.8–10.5)
WBC # FLD AUTO: 5.47 K/UL — SIGNIFICANT CHANGE UP (ref 3.8–10.5)

## 2025-07-16 PROCEDURE — 93010 ELECTROCARDIOGRAM REPORT: CPT

## 2025-07-16 PROCEDURE — 99285 EMERGENCY DEPT VISIT HI MDM: CPT

## 2025-07-16 RX ADMIN — Medication 40 MILLIEQUIVALENT(S): at 18:48

## 2025-07-16 NOTE — ED ADULT NURSE NOTE - OBJECTIVE STATEMENT
74 yr old female AOx4. C/o abnormal EKG. Pt went  to PCP for dizziness intermittently x3 months. Denies dizziness currently. no neuro deficits. EKG performed in ED. Pt denies any pain, CP, SOB, N/V/D, fever/chills ,h/a. PMH HTN

## 2025-07-16 NOTE — ED ADULT NURSE NOTE - CHIEF COMPLAINT
Symptomatic care; encourage fluids and rest.  Humidifier as needed. We will call or message tomorrow with Covid results as well as strep PCR. Call or return if symptoms worsen i.e. persistent or new fever, labored breathing, decreased fluid intake, etc  Return for 30mo visit or sooner as needed.
The patient is a 74y Female complaining of medical evaluation.

## 2025-07-16 NOTE — ED ADULT NURSE NOTE - CHIEF COMPLAINT QUOTE
Dizziness x 3 months , abnormal EKG noted sent by urgent care for a new bundle branch block  denies CP or SOB  H/O HTN Creole speaking

## 2025-07-16 NOTE — ED PROVIDER NOTE - CHIEF COMPLAINT
7/15/2024        No future appointments.    
The patient is a 74y Female complaining of medical evaluation.

## 2025-07-16 NOTE — ED ADULT NURSE NOTE - ED CARDIAC HEART SOUNDS
Physical Therapy Visit    Visit Type: Daily Treatment Note  Visit: 5  Referring Provider: SAROJ Ramirez  Medical Diagnosis (from order): Diagnosis Information    Diagnosis  724.5 (ICD-9-CM) - M54.9 (ICD-10-CM) - Back pain, unspecified back location, unspecified back pain laterality, unspecified chronicity  723.1 (ICD-9-CM) - M54.2 (ICD-10-CM) - Neck pain  719.41 (ICD-9-CM) - M25.512 (ICD-10-CM) - Left shoulder pain, unspecified chronicity  719.46 (ICD-9-CM) - M25.561, M25.562 (ICD-10-CM) - Pain in both knees, unspecified chronicity         SUBJECTIVE                                                                                                               No change in status. Pt was about 5 min late today.     Location: Chronic low back pain, neck pain, bilateral shoulder (L>R) pain, and bilateral knee (L>R) pain    Pain / Symptoms  - Pain rating (out of 10): Current: 9       OBJECTIVE                                                                                                                                       Treatment    Cryotherapy (02615): Cold Pack  - Location: left shoulder   - Position: sitting  - Duration: 15 minutes    Un-attended Electrical Stimulation (95915/): Interferential Current  - Purpose: pain relief  - Location: low back   - Position: sitting  - Pulse Rate:  Hz  - Intensity: patient tolerance  - Delivered via: pads  - Electrode Placement: IFC: 2 channels; 4pads; 10 mA  - In conjunction with: moist hot pack  - Temperature: 168° F  - Duration: 15 minutes    Call button provided   Cold pack to L shoulder concurrent with heat/estim to low back   Results: decreased pain and tissue softening  Reaction: no adverse reaction to treatment      Therapeutic Exercise  Recumbent bike / SciFit Machine, level 1 (seat 10), x4 min for tissue perfusion. Patient reports increased back and knee pain, requests to discontinue after about 4 minutes.   Verbal review of HEP  Left UT stretch 3  x 15   Median nerve glide L UE 1 x 10         ASSESSMENT                                                                                                            Due to elevated pain levels, session focused on pain management with modalities to low back and left shoulder; pt's chief compliant areas today. Good response to modalities \"it felt like a massage\". Patient would benefit from continued skilled therapy for progression of HEP, decreased pain, and increased functional mobility.  Education:   - Results of above outlined education: Verbalizes understanding    PLAN                                                                                                                           Suggestions for next session as indicated: Progress per plan of care     Jonathan Vargas PT, DPT  Date: 8/9/2023  Phone: [phone]  Pager: [phone]    Therapy procedure time and total treatment time can be found documented on the Time Entry flowsheet     normal S1, S2 heard

## 2025-07-16 NOTE — ED PROVIDER NOTE - PATIENT PORTAL LINK FT
You can access the FollowMyHealth Patient Portal offered by [facility] by registering at the following website: [email or web address]. By joining [facility]’s FollowMyHealth portal, you will also be able to view your health information using other applications (apps) compatible with our system.

## 2025-07-16 NOTE — ED ADULT TRIAGE NOTE - CHIEF COMPLAINT QUOTE
Dizziness x 3 months , abnormal EKG noted denies CP or SOB  H/O HTN Creole speaking Dizziness x 3 months , abnormal EKG noted sent by urgent care for a new bundle branch block  denies CP or SOB  H/O HTN Creole speaking

## 2025-07-16 NOTE — ED PROVIDER NOTE - NSFOLLOWUPINSTRUCTIONS_ED_ALL_ED_FT
You were seen here today for abnormal EKG  Your labs tests were normal except your potassium was slightly low  We replaced your potassium  Given no chest pain you can follow up with cardiology outpatient  Please see cardiology within one week  If new chest pain or symptoms arise please seek urgent medical attention

## 2025-07-16 NOTE — ED PROVIDER NOTE - CLINICAL SUMMARY MEDICAL DECISION MAKING FREE TEXT BOX
75 y/o F with PMH HTN presents to ED sent by her PCP for abnormal EKG. Patient was at her PCP office today for a regularly scheduled follow up. An EKG was done and pt was told it changed from the year prior. Pt states she was told she has a bundle block. Patient with no complaints o chest pain or SOB. States she has never had chest pain or SOB. She feels well and would like to go home.  Pt is in NAD on exam, lungs CTA. RRR s1 s2, no edema, Will get labs including troponin. If normal can dc home with out patient cardiology follow up

## 2025-07-16 NOTE — ED PROVIDER NOTE - CARE PROVIDER_API CALL
Nidhi Ulrich ()  Cardiovascular Disease  [address] [place] [address]  Phone: [phone]  Fax: [phone]  Follow Up Time:

## 2025-07-16 NOTE — ED ADULT NURSE NOTE - NSFALLASSESSNEED_ED_ALL_ED
Goal Outcome Evaluation:  Plan of Care Reviewed With: patient        Progress: declining  Outcome Summary: coreg and hydralazine held this evening for hypotensions following temp dialysis cath placement and dialysis. Dr. James notified and changes made to medications. pt also having some incisional pain, PRN medications ordered and given. pt states she had a BM yesterday (12/16/21) but was not charted. pt alert x4 at this time. NSR on monitor.   no

## 2025-07-18 DIAGNOSIS — Z01.89 ENCOUNTER FOR OTHER SPECIFIED SPECIAL EXAMINATIONS: ICD-10-CM

## 2025-07-18 DIAGNOSIS — E87.6 HYPOKALEMIA: ICD-10-CM

## 2025-07-18 DIAGNOSIS — Z88.0 ALLERGY STATUS TO PENICILLIN: ICD-10-CM

## 2025-07-18 DIAGNOSIS — I10 ESSENTIAL (PRIMARY) HYPERTENSION: ICD-10-CM

## 2025-07-18 DIAGNOSIS — I44.7 LEFT BUNDLE-BRANCH BLOCK, UNSPECIFIED: ICD-10-CM

## 2025-07-18 DIAGNOSIS — R94.31 ABNORMAL ELECTROCARDIOGRAM [ECG] [EKG]: ICD-10-CM
